# Patient Record
Sex: MALE | Race: OTHER | Employment: FULL TIME | URBAN - METROPOLITAN AREA
[De-identification: names, ages, dates, MRNs, and addresses within clinical notes are randomized per-mention and may not be internally consistent; named-entity substitution may affect disease eponyms.]

---

## 2018-03-22 PROBLEM — M46.1 SACROILIITIS, NOT ELSEWHERE CLASSIFIED (HCC): Status: ACTIVE | Noted: 2018-03-22

## 2021-06-08 ENCOUNTER — OFFICE VISIT (OUTPATIENT)
Dept: PAIN MANAGEMENT | Age: 41
End: 2021-06-08
Payer: COMMERCIAL

## 2021-06-08 VITALS
WEIGHT: 220 LBS | HEART RATE: 100 BPM | TEMPERATURE: 97.8 F | DIASTOLIC BLOOD PRESSURE: 75 MMHG | SYSTOLIC BLOOD PRESSURE: 141 MMHG | HEIGHT: 69 IN | BODY MASS INDEX: 32.58 KG/M2

## 2021-06-08 DIAGNOSIS — M54.16 LUMBAR RADICULOPATHY: ICD-10-CM

## 2021-06-08 DIAGNOSIS — G89.4 CHRONIC PAIN SYNDROME: ICD-10-CM

## 2021-06-08 DIAGNOSIS — M47.817 LUMBOSACRAL SPONDYLOSIS WITHOUT MYELOPATHY: ICD-10-CM

## 2021-06-08 DIAGNOSIS — M46.1 SACROILIITIS, NOT ELSEWHERE CLASSIFIED (HCC): ICD-10-CM

## 2021-06-08 DIAGNOSIS — Z79.891 ENCOUNTER FOR LONG-TERM OPIATE ANALGESIC USE: ICD-10-CM

## 2021-06-08 PROCEDURE — 99213 OFFICE O/P EST LOW 20 MIN: CPT | Performed by: NURSE PRACTITIONER

## 2021-06-08 RX ORDER — PREGABALIN 75 MG/1
75 CAPSULE ORAL 2 TIMES DAILY
Qty: 60 CAPSULE | Refills: 1 | Status: SHIPPED | OUTPATIENT
Start: 2021-06-10 | End: 2021-08-11 | Stop reason: SDUPTHER

## 2021-06-08 RX ORDER — TRAMADOL HYDROCHLORIDE 50 MG/1
50 TABLET ORAL 2 TIMES DAILY PRN
Qty: 45 TABLET | Refills: 0 | Status: SHIPPED | OUTPATIENT
Start: 2021-06-14 | End: 2021-07-07 | Stop reason: SDUPTHER

## 2021-06-08 ASSESSMENT — ENCOUNTER SYMPTOMS
GASTROINTESTINAL NEGATIVE: 1
EYES NEGATIVE: 1
SHORTNESS OF BREATH: 0
BACK PAIN: 1
COUGH: 0

## 2021-06-08 NOTE — PROGRESS NOTES
[facility]) Physicians  Neurosurgery and Pain [facility]  [address], [address] [facility] , [address] [facility], [place]: [phone]  F: [phone]        Άγιος Γεώργιος 4  (1980)    6/8/2021    Subjective:     Άγιος Γεώργιος 4 is 35 y.o. male who complains today of:    Chief Complaint   Patient presents with    Back Pain         Allergies:  Patient has no known allergies. No past medical history on file. No past surgical history on file. No family history on file. Social History     Socioeconomic History    Marital status:      Spouse name: Not on file    Number of children: Not on file    Years of education: Not on file    Highest education level: Not on file   Occupational History    Not on file   Tobacco Use    Smoking status: Never Smoker    Smokeless tobacco: Never Used   Substance and Sexual Activity    Alcohol use: Yes     Alcohol/week: 0.0 standard drinks    Drug use: No    Sexual activity: Not on file   Other Topics Concern    Not on file   Social History Narrative    Not on file     Social Determinants of Health     Financial Resource Strain:     Difficulty of Paying Living Expenses:    Food Insecurity:     Worried About Running Out of Food in the Last Year:     [address] in the Last Year:    Transportation Needs:     Lack of Transportation (Medical):      Lack of Transportation (Non-Medical):    Physical Activity:     Days of Exercise per Week:     Minutes of Exercise per Session:    Stress:     Feeling of Stress :    Social Connections:     Frequency of Communication with Friends and Family:     Frequency of Social Gatherings with Friends and Family:     Attends Confucianism Services:     Active Member of Clubs or Organizations:     Attends Club or Organization Meetings:     Marital Status:    Intimate Partner Violence:     Fear of Current or Ex-Partner:     Emotionally Abused:     Physically Abused:     Sexually Abused: Current Outpatient Medications on File Prior to Visit   Medication Sig Dispense Refill    traMADol (ULTRAM) 50 MG tablet Take 1 tablet by mouth 2 times daily as needed for Pain for up to 30 days. Can take 1-2 tabs daily PRN pain for 30 days. #45 tabs. Fill date 5/15/21 45 tablet 0    pregabalin (LYRICA) 75 MG capsule Take 1 capsule by mouth 2 times daily for 30 days. 60 capsule 0    lidocaine (XYLOCAINE) 5 % ointment Apply topically as needed. Apply as directed BID PRN 1 Tube 2    testosterone enanthate (DELATESTRYL) 200 MG/ML injection Inject 1 mL into the muscle once for 1 dose. 1 ml every 2weeks 10 mL 0    Syringe/Needle, Disp, (SYRINGE 3CC/62DF1-6/2\") 22G X 1-1/2\" 3 ML MISC by Does not apply route. 20 each 06     No current facility-administered medications on file prior to visit. He {HAS HAS BIJ:13536} tried physical therapy. Date of lastof last PT treatment: {NONE DEFAULTED:16791::\"none\"}    HPI    Review of Systems      Objective:     Vitals:  BP (!) 141/75 (Site: Right Upper Arm)   Pulse 100   Temp 97.8 °F (36.6 °C) (Temporal)   Ht 5' 9\" (1.753 m)   Wt 220 lb (99.8 kg)   BMI 32.49 kg/m²        Physical Exam      Assessment:     {No diagnosis found. (Refresh or delete this SmartLink)}    Plan:          No orders of the defined types were placed in this encounter. No orders of the defined types were placed in this encounter. Follow up:  No follow-ups on file.     Reddy Land, APRN - CNP
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
## 2021-07-07 ENCOUNTER — OFFICE VISIT (OUTPATIENT)
Dept: PAIN MANAGEMENT | Age: 41
End: 2021-07-07
Payer: COMMERCIAL

## 2021-07-07 VITALS — HEIGHT: 69 IN | BODY MASS INDEX: 32.58 KG/M2 | TEMPERATURE: 97.4 F | WEIGHT: 220 LBS

## 2021-07-07 DIAGNOSIS — M47.817 LUMBOSACRAL SPONDYLOSIS WITHOUT MYELOPATHY: ICD-10-CM

## 2021-07-07 DIAGNOSIS — Z79.891 ENCOUNTER FOR LONG-TERM OPIATE ANALGESIC USE: ICD-10-CM

## 2021-07-07 DIAGNOSIS — M46.1 SACROILIITIS, NOT ELSEWHERE CLASSIFIED (HCC): ICD-10-CM

## 2021-07-07 DIAGNOSIS — G89.4 CHRONIC PAIN SYNDROME: ICD-10-CM

## 2021-07-07 DIAGNOSIS — M54.16 LUMBAR RADICULOPATHY: ICD-10-CM

## 2021-07-07 PROBLEM — G47.33 OBSTRUCTIVE SLEEP APNEA SYNDROME: Status: ACTIVE | Noted: 2021-07-07

## 2021-07-07 PROCEDURE — 99213 OFFICE O/P EST LOW 20 MIN: CPT | Performed by: NURSE PRACTITIONER

## 2021-07-07 RX ORDER — TRAMADOL HYDROCHLORIDE 50 MG/1
50 TABLET ORAL 2 TIMES DAILY PRN
Qty: 45 TABLET | Refills: 0 | Status: SHIPPED | OUTPATIENT
Start: 2021-07-14 | End: 2021-08-11 | Stop reason: SDUPTHER

## 2021-07-07 RX ORDER — TRAZODONE HYDROCHLORIDE 50 MG/1
TABLET ORAL
COMMUNITY
Start: 2021-06-11 | End: 2022-05-18

## 2021-07-07 ASSESSMENT — ENCOUNTER SYMPTOMS
DIARRHEA: 0
CONSTIPATION: 0
COUGH: 0
EYES NEGATIVE: 1
NAUSEA: 0
GASTROINTESTINAL NEGATIVE: 1
BACK PAIN: 1
SHORTNESS OF BREATH: 0
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
## 2021-08-11 ENCOUNTER — OFFICE VISIT (OUTPATIENT)
Dept: PAIN MANAGEMENT | Age: 41
End: 2021-08-11
Payer: COMMERCIAL

## 2021-08-11 VITALS
SYSTOLIC BLOOD PRESSURE: 130 MMHG | BODY MASS INDEX: 32.58 KG/M2 | WEIGHT: 220 LBS | HEIGHT: 69 IN | TEMPERATURE: 97.6 F | DIASTOLIC BLOOD PRESSURE: 90 MMHG

## 2021-08-11 DIAGNOSIS — M25.561 ACUTE PAIN OF RIGHT KNEE: Primary | ICD-10-CM

## 2021-08-11 DIAGNOSIS — M47.817 LUMBOSACRAL SPONDYLOSIS WITHOUT MYELOPATHY: ICD-10-CM

## 2021-08-11 DIAGNOSIS — Z79.891 ENCOUNTER FOR LONG-TERM OPIATE ANALGESIC USE: ICD-10-CM

## 2021-08-11 DIAGNOSIS — M54.16 LUMBAR RADICULOPATHY: ICD-10-CM

## 2021-08-11 DIAGNOSIS — M46.1 SACROILIITIS, NOT ELSEWHERE CLASSIFIED (HCC): ICD-10-CM

## 2021-08-11 DIAGNOSIS — G89.4 CHRONIC PAIN SYNDROME: ICD-10-CM

## 2021-08-11 PROCEDURE — 99213 OFFICE O/P EST LOW 20 MIN: CPT | Performed by: NURSE PRACTITIONER

## 2021-08-11 RX ORDER — METHYLPREDNISOLONE 4 MG/1
TABLET ORAL
Qty: 1 KIT | Refills: 0 | Status: SHIPPED | OUTPATIENT
Start: 2021-08-11 | End: 2021-08-17

## 2021-08-11 RX ORDER — PREGABALIN 75 MG/1
75 CAPSULE ORAL 2 TIMES DAILY
Qty: 60 CAPSULE | Refills: 1 | Status: SHIPPED | OUTPATIENT
Start: 2021-08-11 | End: 2021-10-11 | Stop reason: SDUPTHER

## 2021-08-11 RX ORDER — TRAMADOL HYDROCHLORIDE 50 MG/1
50 TABLET ORAL 2 TIMES DAILY PRN
Qty: 45 TABLET | Refills: 0 | Status: SHIPPED | OUTPATIENT
Start: 2021-08-14 | End: 2021-09-13 | Stop reason: SDUPTHER

## 2021-08-11 ASSESSMENT — ENCOUNTER SYMPTOMS
EYES NEGATIVE: 1
COUGH: 0
CONSTIPATION: 0
BACK PAIN: 1
DIARRHEA: 0
NAUSEA: 0
GASTROINTESTINAL NEGATIVE: 1
SHORTNESS OF BREATH: 0

## 2021-08-11 NOTE — LETTER
08/11/21    To Whom It May Concern: This is to certify that Morris Peters is currently under my professional care. Margi Quintanilla will be:     []   disabled until approximately     []   able to return to work on      []   No work restrictions      []   Work restrictions include    [x]  Margi Quintanilla was seen in our office today for an appointment.       Comments:         Provider: Zoey Russ, LOW - CNP
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
## 2021-09-13 ENCOUNTER — OFFICE VISIT (OUTPATIENT)
Dept: PAIN MANAGEMENT | Age: 41
End: 2021-09-13
Payer: COMMERCIAL

## 2021-09-13 VITALS — WEIGHT: 220 LBS | TEMPERATURE: 96.9 F | HEIGHT: 69 IN | BODY MASS INDEX: 32.58 KG/M2

## 2021-09-13 DIAGNOSIS — M47.817 LUMBOSACRAL SPONDYLOSIS WITHOUT MYELOPATHY: ICD-10-CM

## 2021-09-13 DIAGNOSIS — G89.4 CHRONIC PAIN SYNDROME: ICD-10-CM

## 2021-09-13 DIAGNOSIS — M54.16 LUMBAR RADICULOPATHY: ICD-10-CM

## 2021-09-13 DIAGNOSIS — Z79.891 ENCOUNTER FOR LONG-TERM OPIATE ANALGESIC USE: ICD-10-CM

## 2021-09-13 DIAGNOSIS — M46.1 SACROILIITIS, NOT ELSEWHERE CLASSIFIED (HCC): ICD-10-CM

## 2021-09-13 PROCEDURE — 99213 OFFICE O/P EST LOW 20 MIN: CPT | Performed by: NURSE PRACTITIONER

## 2021-09-13 RX ORDER — TRAMADOL HYDROCHLORIDE 50 MG/1
50 TABLET ORAL 2 TIMES DAILY PRN
Qty: 45 TABLET | Refills: 0 | Status: SHIPPED | OUTPATIENT
Start: 2021-09-16 | End: 2021-10-11 | Stop reason: SDUPTHER

## 2021-09-13 ASSESSMENT — ENCOUNTER SYMPTOMS
COUGH: 0
SHORTNESS OF BREATH: 0
DIARRHEA: 0
NAUSEA: 0
BACK PAIN: 1
EYES NEGATIVE: 1
CONSTIPATION: 0
GASTROINTESTINAL NEGATIVE: 1
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
## 2021-10-11 ENCOUNTER — OFFICE VISIT (OUTPATIENT)
Dept: PAIN MANAGEMENT | Age: 41
End: 2021-10-11
Payer: COMMERCIAL

## 2021-10-11 VITALS
HEIGHT: 69 IN | DIASTOLIC BLOOD PRESSURE: 80 MMHG | BODY MASS INDEX: 32.58 KG/M2 | TEMPERATURE: 97.6 F | WEIGHT: 220 LBS | SYSTOLIC BLOOD PRESSURE: 124 MMHG

## 2021-10-11 DIAGNOSIS — M47.817 LUMBOSACRAL SPONDYLOSIS WITHOUT MYELOPATHY: ICD-10-CM

## 2021-10-11 DIAGNOSIS — M54.16 LUMBAR RADICULOPATHY: ICD-10-CM

## 2021-10-11 DIAGNOSIS — G89.4 CHRONIC PAIN SYNDROME: ICD-10-CM

## 2021-10-11 DIAGNOSIS — Z79.891 ENCOUNTER FOR LONG-TERM OPIATE ANALGESIC USE: ICD-10-CM

## 2021-10-11 DIAGNOSIS — M46.1 SACROILIITIS, NOT ELSEWHERE CLASSIFIED (HCC): ICD-10-CM

## 2021-10-11 PROCEDURE — 99213 OFFICE O/P EST LOW 20 MIN: CPT | Performed by: NURSE PRACTITIONER

## 2021-10-11 RX ORDER — PREGABALIN 75 MG/1
75 CAPSULE ORAL 2 TIMES DAILY
Qty: 60 CAPSULE | Refills: 1 | Status: SHIPPED | OUTPATIENT
Start: 2021-10-11 | End: 2021-12-15 | Stop reason: SDUPTHER

## 2021-10-11 RX ORDER — TRAMADOL HYDROCHLORIDE 50 MG/1
50 TABLET ORAL 2 TIMES DAILY PRN
Qty: 45 TABLET | Refills: 0 | Status: SHIPPED | OUTPATIENT
Start: 2021-10-16 | End: 2021-10-18 | Stop reason: SDUPTHER

## 2021-10-11 ASSESSMENT — ENCOUNTER SYMPTOMS
SHORTNESS OF BREATH: 0
EYES NEGATIVE: 1
NAUSEA: 0
CONSTIPATION: 0
GASTROINTESTINAL NEGATIVE: 1
BACK PAIN: 1
COUGH: 0
DIARRHEA: 0

## 2021-10-11 NOTE — PROGRESS NOTES
Lizzy Fuentes  (1980)    10/11/2021    Subjective:     Lizzy Fuentes is 35 y.o. male who complains today of:    Chief Complaint   Patient presents with    Back Pain         Allergies:  Patient has no known allergies. No past medical history on file. No past surgical history on file. No family history on file. Social History     Socioeconomic History    Marital status:      Spouse name: Not on file    Number of children: Not on file    Years of education: Not on file    Highest education level: Not on file   Occupational History    Not on file   Tobacco Use    Smoking status: Never Smoker    Smokeless tobacco: Never Used   Substance and Sexual Activity    Alcohol use: Yes     Alcohol/week: 0.0 standard drinks    Drug use: No    Sexual activity: Not on file   Other Topics Concern    Not on file   Social History Narrative    Not on file     Social Determinants of Health     Financial Resource Strain:     Difficulty of Paying Living Expenses:    Food Insecurity:     Worried About Running Out of Food in the Last Year:     [address] in the Last Year:    Transportation Needs:     Lack of Transportation (Medical):      Lack of Transportation (Non-Medical):    Physical Activity:     Days of Exercise per Week:     Minutes of Exercise per Session:    Stress:     Feeling of Stress :    Social Connections:     Frequency of Communication with Friends and Family:     Frequency of Social Gatherings with Friends and Family:     Attends Buddhist Services:     Active Member of Clubs or Organizations:     Attends Club or Organization Meetings:     Marital Status:    Intimate Partner Violence:     Fear of Current or Ex-Partner:     Emotionally Abused:     Physically Abused:     Sexually Abused:        Current Outpatient Medications on File Prior to Visit   Medication Sig Dispense Refill    traZODone (DESYREL) 50 MG tablet TAKE 1 TABLET DAILY AT BEDTIME      lidocaine (XYLOCAINE) 5 % ointment Apply topically as needed. Apply as directed BID PRN 1 Tube 2    Syringe/Needle, Disp, (SYRINGE 3CC/01KY5-3/2\") 22G X 1-1/2\" 3 ML MISC by Does not apply route. 20 each 06    testosterone enanthate (DELATESTRYL) 200 MG/ML injection Inject 1 mL into the muscle once for 1 dose. 1 ml every 2weeks 10 mL 0     No current facility-administered medications on file prior to visit. Pt presents today for a f/u of his pain. PCP is Dr. Madisyn Lanza MD.  Pt last saw  this NP. He says he is having more Rt LE pain that is radiating down his leg and Rt low back pain worse. Says Past RF ablation helped back and LE pain. Denies radiating pain. He says he is getting \"flare ups\" a lot and is thinking he is ready for procedure. He had to call of work today. 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 He thinks the weather change isn't helping Discussion of consideration of PT for the knee as well. He had Rt L2,3,4,5 RF ablation on 3/18/21 which he feels continues to offer significant relief. He says he is able to move more. He has chronic low back and right leg pain. Right leg pain goes down into the foot at times. He works full time. He has left lami with Dr Sherly Rogers years ago. Rt L5-S1 cornelius denied by his insurance - needs MRI/PT. Hx Rt L5-S1 cornelius, SI joint injections and Rt L2,3,4,5 RF ablation in the past all with relief and to allow him to be more active. Uses a right knee brace at times.      He Lyrica 75mg BID - He says he does take this BID and feels it is helpful. He continues tramadol 50mg 1-2 tabs daily PRN . He gets trazadone at night from Dr. Madisyn Lanza. Pt feels pain level with his medication is 7/10 today but typically <4/10 he reports, and worse without medication. Pt feels that being on feet, weather change, standing, work makes the pain worse, and medication, RF ablation, stretching makes the pain better.    Pt feels his medication helps   him function and improve his quality of life, specifically says allows to work and do ADL's. Pt denies radiating numbness and tingling. Denies recent falls, injuries or trauma. Pt denies new weakness. Pt reports PT has been done in the past.         Review of Systems   Constitutional: Negative for fever. HENT: Negative. Eyes: Negative. Respiratory: Negative for cough and shortness of breath. Cardiovascular: Negative for chest pain. Gastrointestinal: Negative. Negative for constipation, diarrhea and nausea. Endocrine: Negative. Genitourinary: Negative. Musculoskeletal: Positive for arthralgias and back pain. Negative for neck pain. Skin: Negative. Neurological: Negative for dizziness, weakness and numbness. Psychiatric/Behavioral: Negative. Objective:     Vitals:  /80 (Site: Left Upper Arm, Position: Sitting, Cuff Size: Medium Adult)   Temp 97.6 °F (36.4 °C)   Ht 5' 9\" (1.753 m)   Wt 220 lb (99.8 kg)   BMI 32.49 kg/m² Pain Score:   5      Physical Exam  Vitals and nursing note reviewed. This is a pleasant male who answers questions appropriately and follows commands.  Pt is alert and oriented x 3.  Recent and remote memory is intact.  Mood and affect depressed, judgement and insight are normal.  No signs of distress, no dyspnea or SOB noted. HEENT: PERRL.  Neck is supple, trachea midline.  No lymphadenopathy noted.  Decreased ROM with flexion and extension of low back at extremes.  Tender with palpation to lumbar spine on Rt with positive provacative maneuvers on Rt, mild tenderness over Lt lower spine.  Tightness appreciated over left lower lumbar paraspinal muscles.  Tattoos noted. Negative SLR.   Tightness in both hamstrings noted.  Rt knee without pain with palpation. Full range of motion.    Able to ambulate without difficulty.    Balance and coordination normal.  Strength functional for ambulation. Cranial nerves II-XII are intact. Assessment:      Diagnosis Orders   1.  Lumbosacral spondylosis without myelopathy  traMADol (ULTRAM) 50 MG tablet    pregabalin (LYRICA) 75 MG capsule    NE RADIOFREQUENCY NEUROTOMY LUMBAR OR SACRAL, W IMAGE GUIDANCE, SINGLE    NE RADIOFREQ NEUROTOMY LUMBAR OR SACRAL, W IMAGE GUIDE,EA ADDL LEVEL    NE RADIOFREQ NEUROTOMY LUMBAR OR SACRAL, W IMAGE GUIDE,EA ADDL LEVEL    CHG FLUOR NEEDLE/CATH SPINE/PARASPINAL DX/THER ADDON   2. Chronic pain syndrome  traMADol (ULTRAM) 50 MG tablet   3. Sacroiliitis, not elsewhere classified (HCC)  traMADol (ULTRAM) 50 MG tablet   4. Lumbar radiculopathy  traMADol (ULTRAM) 50 MG tablet    pregabalin (LYRICA) 75 MG capsule   5. Encounter for long-term opiate analgesic use  traMADol (ULTRAM) 50 MG tablet       Plan:     Periodic Controlled Substance Monitoring: Possible medication side effects, risk of tolerance/dependence & alternative treatments discussed., No signs of potential drug abuse or diversion identified. , Assessed functional status., Obtaining appropriate analgesic effect of treatment. (Carlee Rendon, APRN - CNP)    Orders Placed This Encounter   Medications    traMADol (ULTRAM) 50 MG tablet     Sig: Take 1 tablet by mouth 2 times daily as needed for Pain for up to 30 days. Can take 1-2 tabs daily PRN pain for 30 days. #45 tabs. Fill date 10/16/21     Dispense:  45 tablet     Refill:  0     Reduce doses taken as pain becomes manageable    pregabalin (LYRICA) 75 MG capsule     Sig: Take 1 capsule by mouth 2 times daily for 60 days.      Dispense:  60 capsule     Refill:  1       Orders Placed This Encounter   Procedures    CHG FLUOR NEEDLE/CATH SPINE/PARASPINAL DX/THER ADDON     Standing Status:   Future     Standing Expiration Date:   1/9/2022    NE RADIOFREQUENCY NEUROTOMY LUMBAR OR SACRAL, W IMAGE GUIDANCE, SINGLE     Rt L2,3,4,5 RFA with SK     Standing Status:   Future     Standing Expiration Date:   1/9/2022    NE RADIOFREQ NEUROTOMY LUMBAR OR SACRAL, W IMAGE GUIDE,EA ADDL LEVEL     Standing Status:   Future     Standing Expiration Date:   1/9/2022    [place] RADIOFREQ NEUROTOMY LUMBAR OR SACRAL, W IMAGE GUIDE,EA ADDL LEVEL     Standing Status:   Future     Standing Expiration Date:   1/9/2022     Discussed options with the patient today. Anatomic model pathology was shown and reviewed with pt. at this time we will continue Lyrica 75 mg twice a day and tramadol 1 to 2 tablets daily as needed for pain #45 tabs for 30 days. He continues to work but needed to cough work today due to Ruiz [employer] excuse given for today. We will go ahead and order  right L2, L3, L4, L5 RF ablation with Dr. Bhavin Pappas as pt has had >80% pain relief with diagnostic MBB's and improvement with past RF ablations. he has failed conservative treatment in the past. Anatomic model of pathology was shown. Risks and benefits of the procedure were discussed. All questions were answered and patient understands and agrees with the plan. Discussed home exercise program.  Relevant imaging and pain generators reviewed. Pt verbalized understanding and agrees with above plan. Pt has chronic pain. Utox reviewed and appropriate from July 2021- discussed to avoid ETOH and narcotics and reviewed NDP. ORT score 1 - low risk. Narcan Rx declined. Will continue medications for chronic pain that has been previously directed as they do help pt function with ADL and improve quality of life. Pt is aware goal is to use the least amount of medication possible to allow pt to function and help with quality of life as discussed in detail. Ideal to keep MME below 50 which it is. Have discussed proper disposal of narcotic medication. Advised to have medications in safe place and locked up/in lock box. Discussed possible risks of opiate medication with pt, including, but not limited to possible constipation, nausea or vomiting, sedation, urinary retention, dependence and possible addiction. Pt agrees to use medication as prescribed/as directed.  Pt advised to not use opiates while driving or operating heavy equipment, or in situations where pt may harm him/herself or others. Pt is aware that while on narcotics, pt needs to be seen to reassess pain and reassess need for continued medication at that time. NDP reviewed. OARRS was reviewed. This NP saw pt under direct supervision of Dr. Memo Crane. Follow up:  Return in about 5 weeks (around 11/15/2021) for review meds and reassess pain.     Abdifatah Land, APRN - CNP

## 2021-10-11 NOTE — LETTER
10/11/21    To Whom It May Concern: This is to certify that Lizzy Fuentes is currently under my professional care. [x]  Amanda Fulton was seen in our office today for an appointment.       Comments:         Provider: LOW Reza - CNP

## 2021-10-12 ENCOUNTER — TELEPHONE (OUTPATIENT)
Dept: PAIN MANAGEMENT | Age: 41
End: 2021-10-12

## 2021-10-12 NOTE — TELEPHONE ENCOUNTER
ORDER PLACED:    Date: 10/11/21  Description: RIGHT L2,3,4,5 RFA  Order Number: [id number]  Ordering Provider: Kristin Delarosa  Performing Provider: [facility]  CPT Codes: 94799,04135  ICD10 Codes: L95.811    ORDER SENT TO Carolina He TO [facility]

## 2021-10-15 ENCOUNTER — TELEPHONE (OUTPATIENT)
Dept: PAIN MANAGEMENT | Age: 41
End: 2021-10-15

## 2021-10-15 NOTE — TELEPHONE ENCOUNTER
BENEFITS: RT L2,3,4,5 RFA    Insurance: [employer]  Phone: [phone]  Contact Name: Julieta Aguilar  Effective Date: 3.1.2020     Plan year: YES-CALENDAR  Deductible: N/A      Deductible Met: N/A  Allowed/benefits paid at: 100% AFTER $20.00 COPAY  OOP: 500.00 MET $7.21  Freq Limits: [id number] 7 64636-BASED ON MEDICAL NECESSITY  Prior Auth Requirement: YES THROUGH AIM--AUTH COMPLETED BY SV    Notes: NO PRE-EX CLAUSE    Call Reference #: [id number]    Time of call: 9:45AM

## 2021-10-18 DIAGNOSIS — M54.16 LUMBAR RADICULOPATHY: ICD-10-CM

## 2021-10-18 DIAGNOSIS — M47.817 LUMBOSACRAL SPONDYLOSIS WITHOUT MYELOPATHY: ICD-10-CM

## 2021-10-18 DIAGNOSIS — Z79.891 ENCOUNTER FOR LONG-TERM OPIATE ANALGESIC USE: ICD-10-CM

## 2021-10-18 DIAGNOSIS — M46.1 SACROILIITIS, NOT ELSEWHERE CLASSIFIED (HCC): ICD-10-CM

## 2021-10-18 DIAGNOSIS — G89.4 CHRONIC PAIN SYNDROME: ICD-10-CM

## 2021-10-18 RX ORDER — TRAMADOL HYDROCHLORIDE 50 MG/1
50 TABLET ORAL 2 TIMES DAILY PRN
Qty: 45 TABLET | Refills: 0 | Status: SHIPPED | OUTPATIENT
Start: 2021-10-18 | End: 2021-11-15 | Stop reason: SDUPTHER

## 2021-10-18 NOTE — TELEPHONE ENCOUNTER
Requested Prescriptions     Pending Prescriptions Disp Refills    traMADol (ULTRAM) 50 MG tablet 45 tablet 0     Sig: Take 1 tablet by mouth 2 times daily as needed for Pain for up to 30 days. Can take 1-2 tabs daily PRN pain for 30 days. #45 tabs. Fill date 10/16/21       Patient last seen on:  10/11  Date of last surgery:  n/a  Date of last refill: 9/16   Pain level:  n/a  Patient complaining of:  Pharmacy did not receive prescription sent on 10/11. Will Dr. Melecio Head resend?   Future appts: 10/21

## 2021-10-21 ENCOUNTER — OFFICE VISIT (OUTPATIENT)
Dept: PAIN MANAGEMENT | Age: 41
End: 2021-10-21
Payer: COMMERCIAL

## 2021-10-21 DIAGNOSIS — M47.817 LUMBOSACRAL SPONDYLOSIS WITHOUT MYELOPATHY: ICD-10-CM

## 2021-10-21 PROCEDURE — 64636 DESTROY L/S FACET JNT ADDL: CPT | Performed by: PAIN MEDICINE

## 2021-10-21 PROCEDURE — 64635 DESTROY LUMB/SAC FACET JNT: CPT | Performed by: PAIN MEDICINE

## 2021-10-21 RX ORDER — BETAMETHASONE SODIUM PHOSPHATE AND BETAMETHASONE ACETATE 3; 3 MG/ML; MG/ML
6 INJECTION, SUSPENSION INTRA-ARTICULAR; INTRALESIONAL; INTRAMUSCULAR; SOFT TISSUE ONCE
Status: COMPLETED | OUTPATIENT
Start: 2021-10-21 | End: 2021-10-21

## 2021-10-21 RX ORDER — LIDOCAINE HYDROCHLORIDE 10 MG/ML
10 INJECTION, SOLUTION EPIDURAL; INFILTRATION; INTRACAUDAL; PERINEURAL ONCE
Status: COMPLETED | OUTPATIENT
Start: 2021-10-21 | End: 2021-10-21

## 2021-10-21 RX ADMIN — BETAMETHASONE SODIUM PHOSPHATE AND BETAMETHASONE ACETATE 6 MG: 3; 3 INJECTION, SUSPENSION INTRA-ARTICULAR; INTRALESIONAL; INTRAMUSCULAR; SOFT TISSUE at 15:17

## 2021-10-21 RX ADMIN — LIDOCAINE HYDROCHLORIDE 10 MG: 10 INJECTION, SOLUTION EPIDURAL; INFILTRATION; INTRACAUDAL; PERINEURAL at 15:17

## 2021-10-21 NOTE — PROGRESS NOTES
[facility]) Physicians  Neurosurgery and Pain [address] [place] [address]: [phone]  F: [phone]      Lumbar Radio Frequency Ablation     Provider: Severino Branch DO          Patient Name: Valerie Amin : 1980        Date: 10/21/2021      Valerie Amin is here today for interventional pain management. Standard ASI guidelines were followed and sterile technique used. Area was cleaned with Betadine x3. Informed consent was obtained. Fluoroscopic guidance was used for this procedure. Multiple views of fluoroscopy were used during procedure to assist with needle placement. Appropriate sized RF 10mm active tip needle was used and advance to appropriate anatomic location. There was appropriate multifidus contraction noted with motor stimulation at 2 Hz between 0.5-1.5 volts. No limb or gluteal contraction was noted taking it up to 3.5 volts. Prior to lesioning at 80 degrees Celsius for 90 seconds, approximately 0.75mg/1mg of Celestone and ½ cc of 1% preservative free Lidocaine was injected. Impedance was between 200-500 ohms during the procedure. Patient tolerated the procedure well, no obvious complications occurred during the procedure. Patient was appropriately monitored and discharged home in stable condition with their usual motor strength. Post Op instructions were given to patient.           [] Bilateral [] T11 [] L1 [] S1     [] T12 [x] L2 [] S2    [x] Right  [x] L3 [] S3      [x] L4 [] S4    [] Left  [x] L5                              Severino Branch DO

## 2021-11-11 RX ORDER — BETAMETHASONE SODIUM PHOSPHATE AND BETAMETHASONE ACETATE 3; 3 MG/ML; MG/ML
6 INJECTION, SUSPENSION INTRA-ARTICULAR; INTRALESIONAL; INTRAMUSCULAR; SOFT TISSUE ONCE
Status: COMPLETED | OUTPATIENT
Start: 2021-11-11 | End: 2021-11-11

## 2021-11-11 RX ADMIN — BETAMETHASONE SODIUM PHOSPHATE AND BETAMETHASONE ACETATE 6 MG: 3; 3 INJECTION, SUSPENSION INTRA-ARTICULAR; INTRALESIONAL; INTRAMUSCULAR; SOFT TISSUE at 11:38

## 2021-11-15 ENCOUNTER — OFFICE VISIT (OUTPATIENT)
Dept: PAIN MANAGEMENT | Age: 41
End: 2021-11-15
Payer: COMMERCIAL

## 2021-11-15 VITALS
HEIGHT: 69 IN | SYSTOLIC BLOOD PRESSURE: 128 MMHG | DIASTOLIC BLOOD PRESSURE: 78 MMHG | TEMPERATURE: 97.8 F | WEIGHT: 220 LBS | BODY MASS INDEX: 32.58 KG/M2

## 2021-11-15 DIAGNOSIS — G89.4 CHRONIC PAIN SYNDROME: ICD-10-CM

## 2021-11-15 DIAGNOSIS — Z79.891 ENCOUNTER FOR LONG-TERM OPIATE ANALGESIC USE: ICD-10-CM

## 2021-11-15 DIAGNOSIS — M46.1 SACROILIITIS, NOT ELSEWHERE CLASSIFIED (HCC): ICD-10-CM

## 2021-11-15 DIAGNOSIS — M54.16 LUMBAR RADICULOPATHY: ICD-10-CM

## 2021-11-15 DIAGNOSIS — M47.817 LUMBOSACRAL SPONDYLOSIS WITHOUT MYELOPATHY: ICD-10-CM

## 2021-11-15 PROCEDURE — 99213 OFFICE O/P EST LOW 20 MIN: CPT | Performed by: NURSE PRACTITIONER

## 2021-11-15 RX ORDER — TRAMADOL HYDROCHLORIDE 50 MG/1
50 TABLET ORAL 2 TIMES DAILY PRN
Qty: 45 TABLET | Refills: 0 | Status: SHIPPED | OUTPATIENT
Start: 2021-11-17 | End: 2021-12-15 | Stop reason: SDUPTHER

## 2021-11-15 ASSESSMENT — ENCOUNTER SYMPTOMS
NAUSEA: 0
COUGH: 0
GASTROINTESTINAL NEGATIVE: 1
DIARRHEA: 0
BACK PAIN: 1
CONSTIPATION: 0
SHORTNESS OF BREATH: 0
EYES NEGATIVE: 1

## 2021-11-15 NOTE — PROGRESS NOTES
Margie Leavitt  (1980)    11/15/2021    Subjective:     Margie Leavitt is 35 y.o. male who complains today of:    Chief Complaint   Patient presents with    Follow-up     Lower Back Pain         Allergies:  Patient has no known allergies. History reviewed. No pertinent past medical history. History reviewed. No pertinent surgical history. History reviewed. No pertinent family history. Social History     Socioeconomic History    Marital status:      Spouse name: Not on file    Number of children: Not on file    Years of education: Not on file    Highest education level: Not on file   Occupational History    Not on file   Tobacco Use    Smoking status: Never Smoker    Smokeless tobacco: Never Used   Substance and Sexual Activity    Alcohol use: Yes     Alcohol/week: 0.0 standard drinks    Drug use: No    Sexual activity: Not on file   Other Topics Concern    Not on file   Social History Narrative    Not on file     Social Determinants of Health     Financial Resource Strain:     Difficulty of Paying Living Expenses: Not on file   Food Insecurity:     Worried About Running Out of Food in the Last Year: Not on file    [place] of Food in the Last Year: Not on file   Transportation Needs:     Lack of Transportation (Medical): Not on file    Lack of Transportation (Non-Medical):  Not on file   Physical Activity:     Days of Exercise per Week: Not on file    Minutes of Exercise per Session: Not on file   Stress:     Feeling of Stress : Not on file   Social Connections:     Frequency of Communication with Friends and Family: Not on file    Frequency of Social Gatherings with Friends and Family: Not on file    Attends Yazidism Services: Not on file    Active Member of Clubs or Organizations: Not on file    Attends Club or Organization Meetings: Not on file    Marital Status: Not on file   Intimate Partner Violence:     Fear of Current or Ex-Partner: Not on file   [facility] Emotionally Abused: Not on file    Physically Abused: Not on file    Sexually Abused: Not on file   Housing Stability:     Unable to Pay for Housing in the Last Year: Not on file    Number of Places Lived in the Last Year: Not on file    Unstable Housing in the Last Year: Not on file       Current Outpatient Medications on File Prior to Visit   Medication Sig Dispense Refill    pregabalin (LYRICA) 75 MG capsule Take 1 capsule by mouth 2 times daily for 60 days. 60 capsule 1    traZODone (DESYREL) 50 MG tablet TAKE 1 TABLET DAILY AT BEDTIME      lidocaine (XYLOCAINE) 5 % ointment Apply topically as needed. Apply as directed BID PRN 1 Tube 2    testosterone enanthate (DELATESTRYL) 200 MG/ML injection Inject 1 mL into the muscle once for 1 dose. 1 ml every 2weeks 10 mL 0    Syringe/Needle, Disp, (SYRINGE 3CC/45KM7-4/2\") 22G X 1-1/2\" 3 ML MISC by Does not apply route. 20 each 06     No current facility-administered medications on file prior to visit. Pt presents today for a f/u of his pain. PCP is Dr. Angelique Peña MD.  Patient last saw Dr. Elaine Melgar on October 21, 2021 for right L2-3-4 5 RF ablation. Says has had >50% pain relief. He says his Rt LE pain is easing as well. He says he is able to move more. Discussion of consideration of PT for the knee as well in the past, but says this hasn't been too bad \" I haven't had any major flare ups lately. Lawereduardo Walters He has chronic low back and right leg pain. Right leg pain goes down into the foot at times. He works full time. He has left lami with Dr Stephenson Pro years ago. Rt L5-S1 cornelius denied by his insurance - needs MRI/PT. Hx Rt L5-S1 cornelius, SI joint injections and Rt L2,3,4,5 RF ablation in the past all with relief and to allow him to be more active. Uses a right knee brace at times. He is vaccinated against Covid.      He Lyrica 75mg BID - He says he does take this BID and feels it is helpful. He continues tramadol 50mg 1-2 tabs daily PRN .  He gets trazadone at night from Dr. Kathia Holliday feels pain level with his medication is 3/10, and 8/10 without medication. Pt feels that recent illness, weather change, work, prolonged position makes the pain worse, and stretching, medication, RF ablation makes the pain better. Pt feels his medication helps   him function and improve his quality of life, specifically says allows to work and do ADL's. Pt denies radiating numbness and tingling. Denies recent falls, injuries or trauma. Pt denies new weakness. Pt reports PT has been tried in the past.         Review of Systems   Constitutional: Negative for fever. HENT: Negative. Eyes: Negative. Respiratory: Negative for cough and shortness of breath. Cardiovascular: Negative for chest pain. Gastrointestinal: Negative. Negative for constipation, diarrhea and nausea. Endocrine: Negative. Genitourinary: Negative. Musculoskeletal: Positive for arthralgias and back pain. Negative for neck pain. Skin: Negative. Neurological: Positive for numbness. Negative for dizziness and weakness. Psychiatric/Behavioral: Negative. Objective:     Vitals:  /78 (Site: Right Upper Arm)   Temp 97.8 °F (36.6 °C) (Infrared)   Ht 5' 9\" (1.753 m)   Wt 220 lb (99.8 kg)   BMI 32.49 kg/m² Pain Score:   3      Physical Exam  Vitals and nursing note reviewed. This is a pleasant male who answers questions appropriately and follows commands.  Pt is alert and oriented x 3.  Recent and remote memory is intact.  Mood and affect depressed, judgement and insight are normal.  No signs of distress, no dyspnea or SOB noted. HEENT: PERRL.  Neck is supple, trachea midline.  No lymphadenopathy noted.  Decreased ROM with flexion and extension of low back at extremes.  Mild tenderness with palpation to lumbar spine.  Tightness appreciated over left lower lumbar paraspinal muscles.  Tattoos noted. Negative SLR.   Tightness in both hamstrings noted.  Rt knee without pain with palpation.  Full range of motion.    Able to ambulate without difficulty.    Balance and coordination normal.  Strength functional for ambulation. Cranial nerves II-XII are intact. Assessment:      Diagnosis Orders   1. Lumbosacral spondylosis without myelopathy  traMADol (ULTRAM) 50 MG tablet   2. Chronic pain syndrome  traMADol (ULTRAM) 50 MG tablet   3. Sacroiliitis, not elsewhere classified (HCC)  traMADol (ULTRAM) 50 MG tablet   4. Lumbar radiculopathy  traMADol (ULTRAM) 50 MG tablet   5. Encounter for long-term opiate analgesic use  traMADol (ULTRAM) 50 MG tablet       Plan:     Periodic Controlled Substance Monitoring: Possible medication side effects, risk of tolerance/dependence & alternative treatments discussed., No signs of potential drug abuse or diversion identified. , Assessed functional status., Obtaining appropriate analgesic effect of treatment. (Carlee Rendon, APRN - CNP)    Orders Placed This Encounter   Medications    traMADol (ULTRAM) 50 MG tablet     Sig: Take 1 tablet by mouth 2 times daily as needed for Pain for up to 30 days. Can take 1-2 tabs daily PRN pain for 30 days. #45 tabs. Fill date 11/17/21     Dispense:  45 tablet     Refill:  0     Reduce doses taken as pain becomes manageable       No orders of the defined types were placed in this encounter. Discussed options with the patient today. Anatomic model pathology was shown and reviewed with pt. he has a refill of Lyrica 75 mg twice a day and will continue this. We will continue tramadol 1 to 2 tablets daily as needed for pain #45 tabs for 30 days. He is doing better status post right RF ablation. Hopefully this is lasting. He continues to work. . All questions were answered. Discussed home exercise program.  Relevant imaging and pain generators reviewed. Pt verbalized understanding and agrees with above plan. Pt has chronic pain.  Utox reviewed and appropriate fromJuly 2021- discussed to avoid ETOH and narcotics and reviewed NDP.  ORT score 1 - low risk. Narcan Rx declined    Will continue medications for chronic pain that has been previously directed as they do help pt function with ADL and improve quality of life. Pt is aware goal is to use the least amount of medication possible to allow pt to function and help with quality of life as discussed in detail. Ideal to keep MME below 50 which it is. Have discussed proper disposal of narcotic medication. Advised to have medications in safe place and locked up/in lock box. Discussed possible risks of opiate medication with pt, including, but not limited to possible constipation, nausea or vomiting, sedation, urinary retention, dependence and possible addiction. Pt agrees to use medication as prescribed/as directed. Pt advised to not use opiates while driving or operating heavy equipment, or in situations where pt may harm him/herself or others. Pt is aware that while on narcotics, pt needs to be seen to reassess pain and reassess need for continued medication at that time. NDP reviewed. OARRS was reviewed. This NP saw pt under direct supervision of Dr. Denton Joseph. Follow up:  Return in about 4 weeks (around 12/13/2021) for review meds and reassess pain.     Lina Land, APRN - CNP

## 2021-12-15 ENCOUNTER — OFFICE VISIT (OUTPATIENT)
Dept: PAIN MANAGEMENT | Age: 41
End: 2021-12-15
Payer: COMMERCIAL

## 2021-12-15 VITALS
DIASTOLIC BLOOD PRESSURE: 82 MMHG | WEIGHT: 220 LBS | TEMPERATURE: 96.8 F | SYSTOLIC BLOOD PRESSURE: 137 MMHG | BODY MASS INDEX: 32.58 KG/M2 | HEIGHT: 69 IN

## 2021-12-15 DIAGNOSIS — M54.16 LUMBAR RADICULOPATHY: ICD-10-CM

## 2021-12-15 DIAGNOSIS — M46.1 SACROILIITIS, NOT ELSEWHERE CLASSIFIED (HCC): ICD-10-CM

## 2021-12-15 DIAGNOSIS — G89.4 CHRONIC PAIN SYNDROME: ICD-10-CM

## 2021-12-15 DIAGNOSIS — M47.817 LUMBOSACRAL SPONDYLOSIS WITHOUT MYELOPATHY: Primary | ICD-10-CM

## 2021-12-15 DIAGNOSIS — Z79.891 ENCOUNTER FOR LONG-TERM OPIATE ANALGESIC USE: ICD-10-CM

## 2021-12-15 PROCEDURE — 99213 OFFICE O/P EST LOW 20 MIN: CPT | Performed by: NURSE PRACTITIONER

## 2021-12-15 RX ORDER — PREGABALIN 75 MG/1
75 CAPSULE ORAL 2 TIMES DAILY
Qty: 60 CAPSULE | Refills: 1 | Status: SHIPPED | OUTPATIENT
Start: 2021-12-15 | End: 2022-03-14 | Stop reason: SDUPTHER

## 2021-12-15 RX ORDER — TRAMADOL HYDROCHLORIDE 50 MG/1
50 TABLET ORAL 2 TIMES DAILY PRN
Qty: 45 TABLET | Refills: 0 | Status: SHIPPED | OUTPATIENT
Start: 2021-12-16 | End: 2022-01-17 | Stop reason: SDUPTHER

## 2021-12-15 ASSESSMENT — ENCOUNTER SYMPTOMS
SHORTNESS OF BREATH: 0
GASTROINTESTINAL NEGATIVE: 1
BACK PAIN: 1
DIARRHEA: 0
CONSTIPATION: 0
EYES NEGATIVE: 1
NAUSEA: 0
COUGH: 0

## 2021-12-15 NOTE — PROGRESS NOTES
Ellen York  (1980)    12/15/2021    Subjective:     Ellen York is 35 y.o. male who complains today of:    Chief Complaint   Patient presents with    Leg Pain         Allergies:  Patient has no known allergies. History reviewed. No pertinent past medical history. History reviewed. No pertinent surgical history. History reviewed. No pertinent family history. Social History     Socioeconomic History    Marital status:      Spouse name: Not on file    Number of children: Not on file    Years of education: Not on file    Highest education level: Not on file   Occupational History    Not on file   Tobacco Use    Smoking status: Never Smoker    Smokeless tobacco: Never Used   Substance and Sexual Activity    Alcohol use: Yes     Alcohol/week: 0.0 standard drinks    Drug use: No    Sexual activity: Not on file   Other Topics Concern    Not on file   Social History Narrative    Not on file     Social Determinants of Health     Financial Resource Strain:     Difficulty of Paying Living Expenses: Not on file   Food Insecurity:     Worried About Running Out of Food in the Last Year: Not on file    [place] of Food in the Last Year: Not on file   Transportation Needs:     Lack of Transportation (Medical): Not on file    Lack of Transportation (Non-Medical):  Not on file   Physical Activity:     Days of Exercise per Week: Not on file    Minutes of Exercise per Session: Not on file   Stress:     Feeling of Stress : Not on file   Social Connections:     Frequency of Communication with Friends and Family: Not on file    Frequency of Social Gatherings with Friends and Family: Not on file    Attends Orthodoxy Services: Not on file    Active Member of Clubs or Organizations: Not on file    Attends Club or Organization Meetings: Not on file    Marital Status: Not on file   Intimate Partner Violence:     Fear of Current or Ex-Partner: Not on file    Emotionally Abused: Not on file  Physically Abused: Not on file    Sexually Abused: Not on file   Housing Stability:     Unable to Pay for Housing in the Last Year: Not on file    Number of Places Lived in the Last Year: Not on file    Unstable Housing in the Last Year: Not on file       Current Outpatient Medications on File Prior to Visit   Medication Sig Dispense Refill    traZODone (DESYREL) 50 MG tablet TAKE 1 TABLET DAILY AT BEDTIME      lidocaine (XYLOCAINE) 5 % ointment Apply topically as needed. Apply as directed BID PRN 1 Tube 2    testosterone enanthate (DELATESTRYL) 200 MG/ML injection Inject 1 mL into the muscle once for 1 dose. 1 ml every 2weeks 10 mL 0    Syringe/Needle, Disp, (SYRINGE 3CC/17ZU8-0/2\") 22G X 1-1/2\" 3 ML MISC by Does not apply route. 20 each 06     No current facility-administered medications on file prior to visit. Pt presents today for a f/u of his pain. PCP is Dr. Ashley Bardales MD.  Patient last saw this NP. On October 21, 2021 had right L2-3-4 5 RF ablation with significant relief. He says the Rt leg pain hasn't been too bad. He hasn't been sleeping well recently so this irritates his pain. He says his knee pain is manageable. He has chronic low back and right leg pain. Right leg pain goes down into the foot at times. He works full time. He has left lami with Dr Trixie Davey years ago. Rt L5-S1 cornelius denied by his insurance - needs MRI/PT. Hx Rt L5-S1 cornelius, SI joint injections and Rt L2,3,4,5 RF ablation in the past all with relief and to allow him to be more active. Uses a right knee brace at times. He is vaccinated against Covid.      He Lyrica 75mg BID - He says he does take this BID and feels it is helpful. He continues tramadol 50mg 1-2 tabs daily PRN. He gets trazadone at night from Dr. Juliette Sanchez feels pain level with his medication is \"almost nothing\", and 5/10 without medication.   Pt feels that weather change, stress, sleep makes the pain worse, and medication, change of position, stretching makes the pain better. Pt feels his medication helps   him function and improve his quality of life, specifically says allows to do ADL's and work. Pt denies radiating numbness and tingling. Denies recent falls, injuries or trauma. Pt denies new weakness. Pt reports PT has been tried in the past.         Review of Systems   Constitutional: Negative for fever. HENT: Negative. Eyes: Negative. Respiratory: Negative for cough and shortness of breath. Cardiovascular: Negative for chest pain. Gastrointestinal: Negative. Negative for constipation, diarrhea and nausea. Endocrine: Negative. Genitourinary: Negative. Musculoskeletal: Positive for arthralgias and back pain. Negative for neck pain. Skin: Negative. Neurological: Negative for dizziness, weakness and numbness. Psychiatric/Behavioral: Negative. Objective:     Vitals:  /82 (Site: Right Upper Arm, Position: Sitting)   Temp 96.8 °F (36 °C)   Ht 5' 9\" (1.753 m)   Wt 220 lb (99.8 kg)   BMI 32.49 kg/m² Pain Score:   5      Physical Exam  Vitals and nursing note reviewed. This is a pleasant male who answers questions appropriately and follows commands.  Pt is alert and oriented x 3.  Recent and remote memory is intact.  Mood and affect, judgement and insight are normal.  No signs of distress, no dyspnea or SOB noted. HEENT: PERRL.  Neck is supple, trachea midline.  No lymphadenopathy noted.  Decreased ROM with flexion and extension of low back at extremes.  Not too tender with palpation to lumbar spine.  Tightness appreciated over left lower lumbar paraspinal muscles.  Tattoos noted. Negative SLR.   Tightness in both hamstrings noted.  Rt knee without pain with palpation.  Full range of motion.    Able to ambulate without difficulty.    Balance and coordination normal.  Strength functional for ambulation. Cranial nerves II-XII are intact.       Assessment:      Diagnosis Orders   1.  Lumbosacral spondylosis without myelopathy  traMADol (ULTRAM) 50 MG tablet    pregabalin (LYRICA) 75 MG capsule   2. Chronic pain syndrome  traMADol (ULTRAM) 50 MG tablet   3. Sacroiliitis, not elsewhere classified (HCC)  traMADol (ULTRAM) 50 MG tablet   4. Lumbar radiculopathy  traMADol (ULTRAM) 50 MG tablet    pregabalin (LYRICA) 75 MG capsule   5. Encounter for long-term opiate analgesic use  traMADol (ULTRAM) 50 MG tablet       Plan:     Periodic Controlled Substance Monitoring: Possible medication side effects, risk of tolerance/dependence & alternative treatments discussed., No signs of potential drug abuse or diversion identified. , Assessed functional status., Obtaining appropriate analgesic effect of treatment. (Carlee Rendon, APRN - CNP)    Orders Placed This Encounter   Medications    traMADol (ULTRAM) 50 MG tablet     Sig: Take 1 tablet by mouth 2 times daily as needed for Pain for up to 30 days. Can take 1-2 tabs daily PRN pain for 30 days. #45 tabs. Fill date 12/16/21     Dispense:  45 tablet     Refill:  0     Reduce doses taken as pain becomes manageable    pregabalin (LYRICA) 75 MG capsule     Sig: Take 1 capsule by mouth 2 times daily for 60 days. Dispense:  60 capsule     Refill:  1       No orders of the defined types were placed in this encounter. Discussed options with the patient today. Anatomic model pathology was shown and reviewed with pt. He needs refills of Lyrica 75 mg twice a day and will continue tramadol 1 to 2 tablets daily as needed for pain #45 tabs for 30 days. He is doing better status post right RF ablation. Hopefully this is lasting. He continues to work. All questions were answered. Discussed home exercise program and  smoking cessation. Relevant imaging and pain generators reviewed. Pt verbalized understanding and agrees with above plan. Pt has chronic pain. Utox reviewed and appropriate from July 2021. ORT score 1 - low risk. Narcan Rx declined.     Will continue medications for chronic pain that has been previously directed as they do help pt function with ADL and improve quality of life. Pt is aware goal is to use the least amount of medication possible to allow pt to function and help with quality of life as discussed in detail. Ideal to keep MME below 50 which it is. Have discussed proper disposal of narcotic medication. Advised to have medications in safe place and locked up/in lock box. Discussed possible risks of opiate medication with pt, including, but not limited to possible constipation, nausea or vomiting, sedation, urinary retention, dependence and possible addiction. Pt agrees to use medication as prescribed/as directed. Pt advised to not use opiates while driving or operating heavy equipment, or in situations where pt may harm him/herself or others. Pt is aware that while on narcotics, pt needs to be seen to reassess pain and reassess need for continued medication at that time. NDP reviewed. OARRS was reviewed. This NP saw pt under direct supervision of Dr. Mariah Champagne. Follow up:  Return in about 4 weeks (around 1/12/2022) for review meds and reassess pain.     Gume Land, APRN - CNP

## 2021-12-20 ENCOUNTER — OFFICE VISIT (OUTPATIENT)
Dept: FAMILY MEDICINE CLINIC | Age: 41
End: 2021-12-20
Payer: COMMERCIAL

## 2021-12-20 VITALS
BODY MASS INDEX: 32.58 KG/M2 | SYSTOLIC BLOOD PRESSURE: 120 MMHG | HEIGHT: 69 IN | HEART RATE: 86 BPM | WEIGHT: 220 LBS | DIASTOLIC BLOOD PRESSURE: 80 MMHG | TEMPERATURE: 97.6 F | OXYGEN SATURATION: 96 %

## 2021-12-20 DIAGNOSIS — H10.33 ACUTE BACTERIAL CONJUNCTIVITIS OF BOTH EYES: Primary | ICD-10-CM

## 2021-12-20 PROCEDURE — 99213 OFFICE O/P EST LOW 20 MIN: CPT | Performed by: NURSE PRACTITIONER

## 2021-12-20 RX ORDER — CIPROFLOXACIN HYDROCHLORIDE 3.5 MG/ML
1-2 SOLUTION/ DROPS TOPICAL 4 TIMES DAILY
Qty: 2 EACH | Refills: 0 | Status: SHIPPED | OUTPATIENT
Start: 2021-12-20 | End: 2021-12-27

## 2021-12-20 SDOH — ECONOMIC STABILITY: FOOD INSECURITY: WITHIN THE PAST 12 MONTHS, YOU WORRIED THAT YOUR FOOD WOULD RUN OUT BEFORE YOU GOT MONEY TO BUY MORE.: NEVER TRUE

## 2021-12-20 SDOH — ECONOMIC STABILITY: FOOD INSECURITY: WITHIN THE PAST 12 MONTHS, THE FOOD YOU BOUGHT JUST DIDN'T LAST AND YOU DIDN'T HAVE MONEY TO GET MORE.: NEVER TRUE

## 2021-12-20 ASSESSMENT — PATIENT HEALTH QUESTIONNAIRE - PHQ9
1. LITTLE INTEREST OR PLEASURE IN DOING THINGS: 0
SUM OF ALL RESPONSES TO PHQ QUESTIONS 1-9: 0
2. FEELING DOWN, DEPRESSED OR HOPELESS: 0
SUM OF ALL RESPONSES TO PHQ QUESTIONS 1-9: 0
SUM OF ALL RESPONSES TO PHQ9 QUESTIONS 1 & 2: 0
SUM OF ALL RESPONSES TO PHQ QUESTIONS 1-9: 0

## 2021-12-20 ASSESSMENT — SOCIAL DETERMINANTS OF HEALTH (SDOH): HOW HARD IS IT FOR YOU TO PAY FOR THE VERY BASICS LIKE FOOD, HOUSING, MEDICAL CARE, AND HEATING?: NOT HARD AT ALL

## 2021-12-20 NOTE — PROGRESS NOTES
Subjective:      Patient ID: Dary Quesada is a 35 y.o. male who presents today for:  Chief Complaint   Patient presents with    Conjunctivitis     Patient is here c/o possible pink eye. Pt states he has discharge and pain in both eyes. Pt states this morning, both eyes were crusted shut. Pt describe pain as burning, states issue has been ongoing for about 3 days. Pt states he has used OTC eye drops with minimal relief. HPI      Only the right eye   He wears contacts   It feels like its on fire  Nasty discharge   He can see fine   They are getting swollen   Its red   It was stuck shut       No past medical history on file. No past surgical history on file. Social History     Socioeconomic History    Marital status:      Spouse name: Not on file    Number of children: Not on file    Years of education: Not on file    Highest education level: Not on file   Occupational History    Not on file   Tobacco Use    Smoking status: Never Smoker    Smokeless tobacco: Never Used   Substance and Sexual Activity    Alcohol use: Yes     Alcohol/week: 0.0 standard drinks    Drug use: No    Sexual activity: Not on file   Other Topics Concern    Not on file   Social History Narrative    Not on file     Social Determinants of Health     Financial Resource Strain: Low Risk     Difficulty of Paying Living Expenses: Not hard at all   Food Insecurity: No Food Insecurity    Worried About Running Out of Food in the Last Year: Never true    [address] in the Last Year: Never true   Transportation Needs:     Lack of Transportation (Medical): Not on file    Lack of Transportation (Non-Medical):  Not on file   Physical Activity:     Days of Exercise per Week: Not on file    Minutes of Exercise per Session: Not on file   Stress:     Feeling of Stress : Not on file   Social Connections:     Frequency of Communication with Friends and Family: Not on file    Frequency of Social Gatherings with Friends and Family: Not on file    Attends Muslim Services: Not on file    Active Member of Clubs or Organizations: Not on file    Attends Club or Organization Meetings: Not on file    Marital Status: Not on file   Intimate Partner Violence:     Fear of Current or Ex-Partner: Not on file    Emotionally Abused: Not on file    Physically Abused: Not on file    Sexually Abused: Not on file   Housing Stability:     Unable to Pay for Housing in the Last Year: Not on file    Number of [place] in the Last Year: Not on file    Unstable Housing in the Last Year: Not on file     No family history on file. No Known Allergies  Current Outpatient Medications   Medication Sig Dispense Refill    traMADol (ULTRAM) 50 MG tablet Take 1 tablet by mouth 2 times daily as needed for Pain for up to 30 days. Can take 1-2 tabs daily PRN pain for 30 days. #45 tabs. Fill date 12/16/21 45 tablet 0    pregabalin (LYRICA) 75 MG capsule Take 1 capsule by mouth 2 times daily for 60 days. 60 capsule 1    traZODone (DESYREL) 50 MG tablet TAKE 1 TABLET DAILY AT BEDTIME      lidocaine (XYLOCAINE) 5 % ointment Apply topically as needed. Apply as directed BID PRN 1 Tube 2    Syringe/Needle, Disp, (SYRINGE 3CC/48BS2-4/2\") 22G X 1-1/2\" 3 ML MISC by Does not apply route. 20 each 06    testosterone enanthate (DELATESTRYL) 200 MG/ML injection Inject 1 mL into the muscle once for 1 dose. 1 ml every 2weeks 10 mL 0     No current facility-administered medications for this visit. Review of Systems   Constitutional: Negative for chills, fatigue and fever. HENT: Negative for congestion, rhinorrhea, sore throat and trouble swallowing. Eyes: Positive for pain, discharge and redness. Negative for visual disturbance. Respiratory: Negative for cough, shortness of breath and wheezing. Gastrointestinal: Negative for diarrhea, nausea and vomiting. Musculoskeletal: Negative for myalgias. Skin: Negative for rash.    Neurological: 0.3 % ophthalmic solution; Place 1-2 drops into both eyes 4 times daily for 7 days      No orders of the defined types were placed in this encounter. Orders Placed This Encounter   Medications    ciprofloxacin (CILOXAN) 0.3 % ophthalmic solution     Sig: Place 1-2 drops into both eyes 4 times daily for 7 days     Dispense:  2 each     Refill:  0     One for left eye and one for right eye     There are no discontinued medications. Return if symptoms worsen or fail to improve. Reviewed with the patient/family: current clinical status & medications. Side effects of the medication prescribed today, as well as treatment plan/rationale and result expectations have been discussed with the patient/family who expresses understanding. Patient will be discharged home in stable condition. Follow up with PCP to evaluate treatment results or return if symptoms worsen or fail to improve. Discussed signs and symptoms which require immediate follow-up in ED/call to 911. Understanding verbalized. I have reviewed the patient's medical history in detail and updated the computerized patient record.     aMria R Swan, APRN - CNP

## 2022-01-17 ENCOUNTER — VIRTUAL VISIT (OUTPATIENT)
Dept: PAIN MANAGEMENT | Age: 42
End: 2022-01-17
Payer: COMMERCIAL

## 2022-01-17 DIAGNOSIS — M47.817 LUMBOSACRAL SPONDYLOSIS WITHOUT MYELOPATHY: ICD-10-CM

## 2022-01-17 DIAGNOSIS — M46.1 SACROILIITIS, NOT ELSEWHERE CLASSIFIED (HCC): ICD-10-CM

## 2022-01-17 DIAGNOSIS — G89.4 CHRONIC PAIN SYNDROME: ICD-10-CM

## 2022-01-17 DIAGNOSIS — M54.16 LUMBAR RADICULOPATHY: ICD-10-CM

## 2022-01-17 DIAGNOSIS — Z79.891 ENCOUNTER FOR LONG-TERM OPIATE ANALGESIC USE: ICD-10-CM

## 2022-01-17 PROCEDURE — 99442 PR PHYS/QHP TELEPHONE EVALUATION 11-20 MIN: CPT | Performed by: NURSE PRACTITIONER

## 2022-01-17 RX ORDER — TRAMADOL HYDROCHLORIDE 50 MG/1
50 TABLET ORAL 2 TIMES DAILY PRN
Qty: 45 TABLET | Refills: 0 | Status: SHIPPED | OUTPATIENT
Start: 2022-01-17 | End: 2022-02-14 | Stop reason: SDUPTHER

## 2022-01-17 NOTE — PROGRESS NOTES
Vandana Gaytan  (1980)    1/17/2022    TELEHEALTH EVALUATION -- Audio/Visual (During KQKEO-06 public health emergency)    Due to Aggie 19 outbreak, patient's office visit was converted to a virtual visit. Patient was contacted and agreed to proceed with a virtual visit via audio-visual platform. Patient understands that this encounter is a billable visit and that insurance coverage and co-pays are up to their individual insurance plans. At the beginning of this telehealth encounter, I verified with the patient their name and date of birth. Verbal consent for telehealth encounter was obtained. Subjective:       Chief Complaint   Patient presents with    Follow-up     Lumbosacral spondylosis without myelopathy       Vandana Gaytan is 35 y.o. male who complains today of: Allergies:  Patient has no known allergies. History:  History reviewed. No pertinent past medical history. History reviewed. No pertinent surgical history. History reviewed. No pertinent family history. Social History     Socioeconomic History    Marital status:      Spouse name: Not on file    Number of children: Not on file    Years of education: Not on file    Highest education level: Not on file   Occupational History    Not on file   Tobacco Use    Smoking status: Never Smoker    Smokeless tobacco: Never Used   Substance and Sexual Activity    Alcohol use: Yes     Alcohol/week: 0.0 standard drinks    Drug use: No    Sexual activity: Not on file   Other Topics Concern    Not on file   Social History Narrative    Not on file     Social Determinants of Health     Financial Resource Strain: Low Risk     Difficulty of Paying Living Expenses: Not hard at all   Food Insecurity: No Food Insecurity    Worried About Running Out of Food in the Last Year: Never true    [address] in the Last Year: Never true   Transportation Needs:     Lack of Transportation (Medical):  Not on file    Lack of Transportation (Non-Medical): Not on file   Physical Activity:     Days of Exercise per Week: Not on file    Minutes of Exercise per Session: Not on file   Stress:     Feeling of Stress : Not on file   Social Connections:     Frequency of Communication with Friends and Family: Not on file    Frequency of Social Gatherings with Friends and Family: Not on file    Attends Jehovah's witness Services: Not on file    Active Member of [address] [place] [address] [employer] or Organizations: Not on file    Attends Club or Organization Meetings: Not on file    Marital Status: Not on file   Intimate Partner Violence:     Fear of Current or Ex-Partner: Not on file    Emotionally Abused: Not on file    Physically Abused: Not on file    Sexually Abused: Not on file   Housing Stability:     Unable to Pay for Housing in the Last Year: Not on file    Number of [place] in the Last Year: Not on file    Unstable Housing in the Last Year: Not on file       Current Outpatient Medications on File Prior to Visit   Medication Sig Dispense Refill    pregabalin (LYRICA) 75 MG capsule Take 1 capsule by mouth 2 times daily for 60 days. 60 capsule 1    traZODone (DESYREL) 50 MG tablet TAKE 1 TABLET DAILY AT BEDTIME      lidocaine (XYLOCAINE) 5 % ointment Apply topically as needed. Apply as directed BID PRN 1 Tube 2    testosterone enanthate (DELATESTRYL) 200 MG/ML injection Inject 1 mL into the muscle once for 1 dose. 1 ml every 2weeks 10 mL 0    Syringe/Needle, Disp, (SYRINGE 3CC/53CH1-8/2\") 22G X 1-1/2\" 3 ML MISC by Does not apply route. 20 each 06     No current facility-administered medications on file prior to visit. Pt's f/u done today with a telehealth visit for his pain d/t Covid 19 pandemic. PCP is Dr. Neli Recinos MD.  Patient last saw this NP. Lyrica refilled last month with refill given. He says he has had some \"flare ups\" of low back pain for the past few weeks. He says the pain will radiate into upper buttock and hip.  Denies radiating numbness. He says he isn't sure if this is d/t the weather. He has been short staffed at work. On October 21, 2021 had right L2-3-4 5 RF ablation with significant relief. He says the Rt leg pain hasn't been too bad. He says his knee pain is manageable. He has chronic low back and right leg pain. Right leg pain goes down into the foot at times. He works full time. He has left lami with Dr Itz Valenzuela years ago. Rt L5-S1 cornelius denied by his insurance - needs MRI/PT. Hx Rt L5-S1 cornelius, SI joint injections and Rt L2,3,4,5 RF ablation in the past all with relief and to allow him to be more active. Uses a right knee brace at times. He is vaccinated against Covid.      He Lyrica 75mg BID - He says he does take this BID and feels it is helpful. He continues tramadol 50mg 1-2 tabs daily PRN. He gets trazadone at night from Dr. Cresencio Hyde. Pt feels pain level with his medication is \"tolerable/takes the edge off\", and 7-8/10 without medication. Pt feels that weather change, working more makes the pain worse, and medication, stretching, getting off feet makes the pain better. Pt feels his medication helps   him function and improve his quality of life, specifically says allows to work and do ADL's. Pt denies radiating numbness and tingling. Denies recent falls, injuries or trauma. Pt denies new weakness. Pt reports PT has been done in the past.           Review of Systems    Objective:     Vitals: There were no vitals taken for this visit.      PHYSICAL EXAMINATION:    [x] Alert  [x] Oriented to person/place/time  [x] No apparent distress      [x] Mood and affect normal  [x] Recent and remote memory intact  [x] Judgement and insight normal     [x] Breathing appears normal  [x] No rash, lesions or ulcers on visible skin    [] Cranial Nerves II-XII grossly intact    [x] Motor grossly intact in visible upper extremities    [] Motor grossly intact in visible lower extremities    [] Normal gait and station   [] No digital cyanosis    [] OTHER:      Due to this being a TeleHealth encounter, evaluation of the following organ systems is limited: Vitals/Constitutional/EENT/Resp/CV/GI//MS/Neuro/Skin/Heme-Lymph-Imm. Assessment:      Diagnosis Orders   1. Sacroiliitis, not elsewhere classified (HCC)  traMADol (ULTRAM) 50 MG tablet   2. Lumbosacral spondylosis without myelopathy  traMADol (ULTRAM) 50 MG tablet   3. Lumbar radiculopathy  traMADol (ULTRAM) 50 MG tablet   4. Chronic pain syndrome  traMADol (ULTRAM) 50 MG tablet   5. Encounter for long-term opiate analgesic use  traMADol (ULTRAM) 50 MG tablet       Plan:     Periodic Controlled Substance Monitoring: Possible medication side effects, risk of tolerance/dependence & alternative treatments discussed. ,No signs of potential drug abuse or diversion identified. ,Assessed functional status. ,Obtaining appropriate analgesic effect of treatment. (Carlee Rendon, APRN - CNP)    Orders Placed This Encounter   Medications    traMADol (ULTRAM) 50 MG tablet     Sig: Take 1 tablet by mouth 2 times daily as needed for Pain for up to 30 days. Can take 1-2 tabs daily PRN pain for 30 days. #45 tabs. Dispense:  45 tablet     Refill:  0     Reduce doses taken as pain becomes manageable       No orders of the defined types were placed in this encounter. Discussed options with the patient today. Telehealth visit d/t COVID-19 as noted above. He is describing more sacroiliac joint pain in his right upper buttock region. Consideration of SI joint injection in the future. Will examine next month to see if this needed. Cannot repeat RF ablation until April. At this time he has enough Lyrica any 5 mg twice a day and will continue his tramadol 50 mg 1-2 tabs daily as needed pain number 45 tablets for 30 days, as  this helps him function and get through work today he reports. All questions were answered. Discussed home exercise program and  smoking cessation.   Relevant imaging and pain generators reviewed. Pt verbalized understanding and agrees with above plan. Pt has chronic pain. Utox reviewed and appropriate from July 2021. ORT score 1 - low risk. Narcan Rx declined    Will continue medications for chronic pain that has been previously directed as they do help pt function with ADL and improve quality of life. Pt is aware goal is to use the least amount of medication possible to allow pt to function and help with quality of life as discussed in detail. Ideal to keep MME below 50 which it is. Have discussed proper disposal of narcotic medication. Advised to have medications in safe place and locked up/in lock box. Discussed possible risks of opiate medication with pt, including, but not limited to possible constipation, nausea or vomiting, sedation, urinary retention, dependence and possible addiction. Pt agrees to use medication as prescribed/as directed. Pt advised to not use opiates while driving or operating heavy equipment, or in situations where pt may harm him/herself or others. Pt is aware that while on narcotics, pt needs to be seen to reassess pain and reassess need for continued medication at that time. NDP reviewed. OARRS was reviewed. This NP saw pt under direct supervision of Dr. Nellie Bhatti. Follow up:  Return in about 4 weeks (around 2/14/2022) for review meds and reassess pain. Carlee Land, APRN - CNP      >50% of 12 minute appointment was spent with discussion, addressing questions and concerns, including prognosis, treatment options, patient education, and recommendations. [id number]    Pursuant to the emergency declaration under the [facility], [facility] waiver authority and the [place] and [employer] Act, this Virtual  Visit was conducted, with patient's consent, to reduce the patient's risk of exposure to COVID-19 and provide continuity of care for an established patient.     Services were provided through a video synchronous discussion virtually to substitute for in-person clinic visit.

## 2022-02-14 ENCOUNTER — VIRTUAL VISIT (OUTPATIENT)
Dept: PAIN MANAGEMENT | Age: 42
End: 2022-02-14
Payer: COMMERCIAL

## 2022-02-14 DIAGNOSIS — G89.4 CHRONIC PAIN SYNDROME: ICD-10-CM

## 2022-02-14 DIAGNOSIS — M46.1 SACROILIITIS, NOT ELSEWHERE CLASSIFIED (HCC): Primary | ICD-10-CM

## 2022-02-14 DIAGNOSIS — M54.16 LUMBAR RADICULOPATHY: ICD-10-CM

## 2022-02-14 DIAGNOSIS — Z79.891 ENCOUNTER FOR LONG-TERM OPIATE ANALGESIC USE: ICD-10-CM

## 2022-02-14 DIAGNOSIS — M47.817 LUMBOSACRAL SPONDYLOSIS WITHOUT MYELOPATHY: ICD-10-CM

## 2022-02-14 PROCEDURE — 99442 PR PHYS/QHP TELEPHONE EVALUATION 11-20 MIN: CPT | Performed by: NURSE PRACTITIONER

## 2022-02-14 RX ORDER — TRAMADOL HYDROCHLORIDE 50 MG/1
50 TABLET ORAL 2 TIMES DAILY PRN
Qty: 45 TABLET | Refills: 0 | Status: SHIPPED | OUTPATIENT
Start: 2022-02-17 | End: 2022-03-14 | Stop reason: SDUPTHER

## 2022-02-14 ASSESSMENT — ENCOUNTER SYMPTOMS
DIARRHEA: 0
SHORTNESS OF BREATH: 0
GASTROINTESTINAL NEGATIVE: 1
NAUSEA: 0
COUGH: 0
EYES NEGATIVE: 1
CONSTIPATION: 0
BACK PAIN: 1

## 2022-02-14 NOTE — PROGRESS NOTES
Damian Lock  (1980)    2/14/2022    TELEHEALTH EVALUATION -- Audio/Visual (During GWYDD-85 public health emergency)    Due to [place] 19 outbreak, patient's office visit was converted to a virtual visit. Patient was contacted and agreed to proceed with a virtual visit via audio-visual platform. Patient understands that this encounter is a billable visit and that insurance coverage and co-pays are up to their individual insurance plans. At the beginning of this telehealth encounter, I verified with the patient their name and date of birth. Verbal consent for telehealth encounter was obtained. Subjective:       Chief Complaint   Patient presents with    Back Pain       Damian Lock is 35 y.o. male who complains today of: Allergies:  Patient has no known allergies. History:  History reviewed. No pertinent past medical history. History reviewed. No pertinent surgical history. History reviewed. No pertinent family history. Social History     Socioeconomic History    Marital status:      Spouse name: Not on file    Number of children: Not on file    Years of education: Not on file    Highest education level: Not on file   Occupational History    Not on file   Tobacco Use    Smoking status: Never Smoker    Smokeless tobacco: Never Used   Substance and Sexual Activity    Alcohol use: Yes     Alcohol/week: 0.0 standard drinks    Drug use: No    Sexual activity: Not on file   Other Topics Concern    Not on file   Social History Narrative    Not on file     Social Determinants of Health     Financial Resource Strain: Low Risk     Difficulty of Paying Living Expenses: Not hard at all   Food Insecurity: No Food Insecurity    Worried About Running Out of Food in the Last Year: Never true    [address] in the Last Year: Never true   Transportation Needs:     Lack of Transportation (Medical): Not on file    Lack of Transportation (Non-Medical):  Not on file   Physical Activity:     Days of Exercise per Week: Not on file    Minutes of Exercise per Session: Not on file   Stress:     Feeling of Stress : Not on file   Social Connections:     Frequency of Communication with Friends and Family: Not on file    Frequency of Social Gatherings with Friends and Family: Not on file    Attends Anabaptist Services: Not on file    Active Member of [address] [place] [address] [employer] or Organizations: Not on file    Attends Club or Organization Meetings: Not on file    Marital Status: Not on file   Intimate Partner Violence:     Fear of Current or Ex-Partner: Not on file    Emotionally Abused: Not on file    Physically Abused: Not on file    Sexually Abused: Not on file   Housing Stability:     Unable to Pay for Housing in the Last Year: Not on file    Number of [place] in the Last Year: Not on file    Unstable Housing in the Last Year: Not on file       Current Outpatient Medications on File Prior to Visit   Medication Sig Dispense Refill    pregabalin (LYRICA) 75 MG capsule Take 1 capsule by mouth 2 times daily for 60 days. 60 capsule 1    traZODone (DESYREL) 50 MG tablet TAKE 1 TABLET DAILY AT BEDTIME      lidocaine (XYLOCAINE) 5 % ointment Apply topically as needed. Apply as directed BID PRN 1 Tube 2    testosterone enanthate (DELATESTRYL) 200 MG/ML injection Inject 1 mL into the muscle once for 1 dose. 1 ml every 2weeks 10 mL 0    Syringe/Needle, Disp, (SYRINGE 3CC/72RX8-4/2\") 22G X 1-1/2\" 3 ML MISC by Does not apply route. 20 each 06     No current facility-administered medications on file prior to visit. Pt's f/u done today with a telehealth visit for his pain d/t Covid 19 pandemic. PCP is Dr. Mahlon Soulier, MD.  Patient last saw this NP. He says he continues to have \"flare ups\" of low back pain and Rt upper buttock pain. Has been stretching the SI joint with some relief. Wonders about injection that was discussed last OV. for the past few weeks.  He says the pain will radiate into upper buttock and hip. Denies radiating numbness. He says he isn't sure if this is d/t the weather. He has been short staffed at work.       On October 21, 2021 had right L2-3-4 5 RF ablation with significant relief. He says the Rt leg pain hasn't been too bad. He says his knee pain is manageable. He has chronic low back and right leg pain. Right leg pain goes down into the foot at times. He works full time. He has left lami with Dr Howard Walter years ago. Rt L5-S1 cornelius denied by his insurance - needs MRI/PT. Hx Rt L5-S1 cornelius, SI joint injections and Rt L2,3,4,5 RF ablation in the past all with relief and to allow him to be more active. Uses a right knee brace at times. He is vaccinated against Covid.      He Lyrica 75mg BID - He says he does take this BID and feels it is helpful - he says he doesn't need this - had old Rx he was using. He continues tramadol 50mg 1-2 tabs daily PRN. He gets trazadone at night from Dr. Jethro Servin. Pt feels pain level with his medication is 0-4/10, and 6-7/10 without medication. Pt feels that weather change, being on feet makes the pain worse, and stretching, medication, past injections makes the pain better. Pt feels his medication helps   him function and improve his quality of life, specifically says allows to relax and do ADL's. Pt denies radiating numbness and tingling. Denies recent falls, injuries or trauma. Pt denies new weakness. Pt reports PT has been tried. Review of Systems   Constitutional: Negative for fever. HENT: Negative. Eyes: Negative. Respiratory: Negative for cough and shortness of breath. Cardiovascular: Negative for chest pain. Gastrointestinal: Negative. Negative for constipation, diarrhea and nausea. Endocrine: Negative. Genitourinary: Negative. Musculoskeletal: Positive for back pain. Negative for arthralgias and neck pain. Skin: Negative. Neurological: Negative for dizziness, weakness and numbness. Psychiatric/Behavioral: Negative. Objective:     Vitals: There were no vitals taken for this visit. PHYSICAL EXAMINATION:    [x] Alert  [x] Oriented to person/place/time  [x] No apparent distress      [x] Mood and affect normal  [x] Recent and remote memory intact  [x] Judgement and insight normal     [x] Breathing appears normal  [x] No rash, lesions or ulcers on visible skin    [] Cranial Nerves II-XII grossly intact    [x] Motor grossly intact in visible upper extremities    [] Motor grossly intact in visible lower extremities    [] Normal gait and station   [] No digital cyanosis    [] OTHER:      Due to this being a TeleHealth encounter, evaluation of the following organ systems is limited: Vitals/Constitutional/EENT/Resp/CV/GI//MS/Neuro/Skin/Heme-Lymph-Imm. Assessment:      Diagnosis Orders   1. Sacroiliitis, not elsewhere classified (HCC)  traMADol (ULTRAM) 50 MG tablet    VA INJECT SI JOINT ARTHRGRPHY&/ANES/STEROID W/IMAGE    CHG FLUOR NEEDLE/CATH SPINE/PARASPINAL DX/THER ADDON   2. Lumbosacral spondylosis without myelopathy  traMADol (ULTRAM) 50 MG tablet   3. Lumbar radiculopathy  traMADol (ULTRAM) 50 MG tablet   4. Chronic pain syndrome  traMADol (ULTRAM) 50 MG tablet   5. Encounter for long-term opiate analgesic use  traMADol (ULTRAM) 50 MG tablet       Plan:     Periodic Controlled Substance Monitoring: Possible medication side effects, risk of tolerance/dependence & alternative treatments discussed. ,No signs of potential drug abuse or diversion identified. ,Assessed functional status. ,Obtaining appropriate analgesic effect of treatment. (Carlee Rendon, APRN - CNP)    Orders Placed This Encounter   Medications    traMADol (ULTRAM) 50 MG tablet     Sig: Take 1 tablet by mouth 2 times daily as needed for Pain for up to 30 days. Can take 1-2 tabs daily PRN pain for 30 days. #45 tabs.   Fill date 2/17/22     Dispense:  45 tablet     Refill:  0     Reduce doses taken as pain becomes manageable       Orders Placed This Encounter   Procedures    CHG FLUOR NEEDLE/CATH SPINE/PARASPINAL DX/THER ADDON     Standing Status:   Future     Standing Expiration Date:   5/15/2022    ID INJECT SI JOINT ARTHRGRPHY&/ANES/STEROID W/IMAGE     Rt SI joint inj with SK     Standing Status:   Future     Standing Expiration Date:   5/15/2022     Discussed options with the patient today. Telehealth visit d/t COVID-19 as noted above. Will order Rt SI joint injection with Dr. Leeroy Rodriguez to settle his Sx as he is describing Rt SI joint pain. Can repeat RF ablation in April if needed. At this time he has enough Lyrica any 5 mg twice a day and will continue his tramadol 50 mg 1-2 tabs daily as needed pain number 45 tablets for 30 days, as  this helps him function and get through work today he reports. Letter for work he had appointment ok. All questions were answered. Discussed home exercise program.  Relevant imaging and pain generators reviewed. Pt verbalized understanding and agrees with above plan. Pt has chronic pain. Utox reviewed and appropriate from July 2021. ORT score 1 - low risk. Narcan Rx declined    Will continue medications for chronic pain that has been previously directed as they do help pt function with ADL and improve quality of life. Pt is aware goal is to use the least amount of medication possible to allow pt to function and help with quality of life as discussed in detail. Ideal to keep MME below 50 which it is. Have discussed proper disposal of narcotic medication. Advised to have medications in safe place and locked up/in lock box. Discussed possible risks of opiate medication with pt, including, but not limited to possible constipation, nausea or vomiting, sedation, urinary retention, dependence and possible addiction. Pt agrees to use medication as prescribed/as directed.  Pt advised to not use opiates while driving or operating heavy equipment, or in situations where pt may harm him/herself or others. Pt is aware that while on narcotics, pt needs to be seen to reassess pain and reassess need for continued medication at that time. NDP reviewed. OARRS was reviewed. This NP saw pt under direct supervision of Dr. Manuel Elliott. Follow up:  Return in about 4 weeks (around 3/14/2022) for f/u after procedure and reassess pain/medications. Carlee Land, APRN - CNP      >50% of 13 minute appointment was spent with discussion, addressing questions and concerns, including prognosis, treatment options, patient education, and recommendations. [id number]}    Pursuant to the emergency declaration under the [facility], 1135 waiver authority and the [employer] Act, this Virtual  Visit was conducted, with patient's consent, to reduce the patient's risk of exposure to COVID-19 and provide continuity of care for an established patient. Services were provided through a video synchronous discussion virtually to substitute for in-person clinic visit.

## 2022-03-02 ENCOUNTER — TELEPHONE (OUTPATIENT)
Dept: PAIN MANAGEMENT | Age: 42
End: 2022-03-02

## 2022-03-02 NOTE — TELEPHONE ENCOUNTER
PATIENT HAS NOT HAD RECENT SI JOINT TESTING IN THE LAST 12 MONTHS AND THAT WILL BE NEEDED TO SUBMIT TO INS. PLEASE SCHEDULE PATIENT FOR AN IN OFFICE APPT SO HE CAN BE EVALUATED FOR SI JOINT PAIN AND WE CAN SUBMIT FOR AUTH.

## 2022-03-02 NOTE — TELEPHONE ENCOUNTER
ORDER PLACED:    Date: 2/14/22  Description: RIGHT SI JOINT INJ  Order Number: [id number]  Ordering Provider: Negar Rudd  Performing Provider: DARIUS  CPT Codes: 03310  ICD10 Codes: M46.1

## 2022-03-14 ENCOUNTER — OFFICE VISIT (OUTPATIENT)
Dept: PAIN MANAGEMENT | Age: 42
End: 2022-03-14
Payer: COMMERCIAL

## 2022-03-14 VITALS
BODY MASS INDEX: 34.51 KG/M2 | HEIGHT: 69 IN | DIASTOLIC BLOOD PRESSURE: 82 MMHG | TEMPERATURE: 97 F | WEIGHT: 233 LBS | SYSTOLIC BLOOD PRESSURE: 128 MMHG

## 2022-03-14 DIAGNOSIS — M46.1 SACROILIITIS, NOT ELSEWHERE CLASSIFIED (HCC): Primary | ICD-10-CM

## 2022-03-14 DIAGNOSIS — G89.4 CHRONIC PAIN SYNDROME: ICD-10-CM

## 2022-03-14 DIAGNOSIS — M47.817 LUMBOSACRAL SPONDYLOSIS WITHOUT MYELOPATHY: ICD-10-CM

## 2022-03-14 DIAGNOSIS — M54.16 LUMBAR RADICULOPATHY: ICD-10-CM

## 2022-03-14 DIAGNOSIS — Z79.891 ENCOUNTER FOR LONG-TERM OPIATE ANALGESIC USE: ICD-10-CM

## 2022-03-14 PROCEDURE — 99213 OFFICE O/P EST LOW 20 MIN: CPT | Performed by: NURSE PRACTITIONER

## 2022-03-14 RX ORDER — PREGABALIN 75 MG/1
75 CAPSULE ORAL 2 TIMES DAILY
Qty: 60 CAPSULE | Refills: 1 | Status: SHIPPED | OUTPATIENT
Start: 2022-03-20 | End: 2022-07-29 | Stop reason: ALTCHOICE

## 2022-03-14 RX ORDER — TRAMADOL HYDROCHLORIDE 50 MG/1
50 TABLET ORAL 2 TIMES DAILY PRN
Qty: 45 TABLET | Refills: 0 | Status: SHIPPED | OUTPATIENT
Start: 2022-03-20 | End: 2022-04-13 | Stop reason: SDUPTHER

## 2022-03-14 ASSESSMENT — ENCOUNTER SYMPTOMS
SHORTNESS OF BREATH: 0
DIARRHEA: 0
CONSTIPATION: 0
EYES NEGATIVE: 1
BACK PAIN: 1
NAUSEA: 0
GASTROINTESTINAL NEGATIVE: 1
COUGH: 0

## 2022-03-14 NOTE — TELEPHONE ENCOUNTER
SK- SI JOINT AUTH- RIGHT X1- 3/15/2022 TO 3/28/2022     OK to schedule procedure approved as above. Please note sides/levels approved and date range.    (If applicable, sides/levels approved may differ from those ordered)    TO BE SCHEDULED WITH DR Caitlin Valentin

## 2022-03-14 NOTE — TELEPHONE ENCOUNTER
AUTHORIZATION: RIGHT SI JOINT      INSURANCE: Keya Chapa VIA: [employer]     Edataya Ritterks #: [id number]    DATE RANGE: 3/15/2022 TO 3/28/2022     TELEPHONE CALL ROUTED TO MA TO SCHEDULE.

## 2022-03-14 NOTE — PROGRESS NOTES
Mel Abraham  (1980)    3/14/2022    Subjective:     Mel Abrhaam is 35 y.o. male who complains today of:    Chief Complaint   Patient presents with    Back Pain         Allergies:  Patient has no known allergies. History reviewed. No pertinent past medical history. History reviewed. No pertinent surgical history. History reviewed. No pertinent family history. Social History     Socioeconomic History    Marital status:      Spouse name: Not on file    Number of children: Not on file    Years of education: Not on file    Highest education level: Not on file   Occupational History    Not on file   Tobacco Use    Smoking status: Never Smoker    Smokeless tobacco: Never Used   Substance and Sexual Activity    Alcohol use: Yes     Alcohol/week: 0.0 standard drinks    Drug use: No    Sexual activity: Not on file   Other Topics Concern    Not on file   Social History Narrative    Not on file     Social Determinants of Health     Financial Resource Strain: Low Risk     Difficulty of Paying Living Expenses: Not hard at all   Food Insecurity: No Food Insecurity    Worried About Running Out of Food in the Last Year: Never true    [address] in the Last Year: Never true   Transportation Needs:     Lack of Transportation (Medical): Not on file    Lack of Transportation (Non-Medical):  Not on file   Physical Activity:     Days of Exercise per Week: Not on file    Minutes of Exercise per Session: Not on file   Stress:     Feeling of Stress : Not on file   Social Connections:     Frequency of Communication with Friends and Family: Not on file    Frequency of Social Gatherings with Friends and Family: Not on file    Attends Church Services: Not on file    Active Member of Clubs or Organizations: Not on file    Attends Club or Organization Meetings: Not on file    Marital Status: Not on file   Intimate Partner Violence:     Fear of Current or Ex-Partner: Not on file   Steve Chanel Emotionally Abused: Not on file    Physically Abused: Not on file    Sexually Abused: Not on file   Housing Stability:     Unable to Pay for Housing in the Last Year: Not on file    Number of Places Lived in the Last Year: Not on file    Unstable Housing in the Last Year: Not on file       Current Outpatient Medications on File Prior to Visit   Medication Sig Dispense Refill    traZODone (DESYREL) 50 MG tablet TAKE 1 TABLET DAILY AT BEDTIME (Patient not taking: Reported on 3/14/2022)      lidocaine (XYLOCAINE) 5 % ointment Apply topically as needed. Apply as directed BID PRN (Patient not taking: Reported on 3/14/2022) 1 Tube 2    testosterone enanthate (DELATESTRYL) 200 MG/ML injection Inject 1 mL into the muscle once for 1 dose. 1 ml every 2weeks 10 mL 0    Syringe/Needle, Disp, (SYRINGE 3CC/35BG7-7/2\") 22G X 1-1/2\" 3 ML MISC by Does not apply route. (Patient not taking: Reported on 3/14/2022) 20 each 06     No current facility-administered medications on file prior to visit. Pt presents today for a f/u of his pain. PCP is Dr. Any Jordan MD.  Patient last saw this NP. Evidently physical exam needed to consider SI joint injection that was ordered last office visit. He is having more Rt upper buttock pain that is driving him crazy. \"during the day it is flared\" and isn't settling down. Denies radiating numbness. Has been stretching the SI joint with some relief. Wonders about injection that was discussed last OV. for the past few weeks. He says the pain will radiate into upper buttock and hip. Denies radiating numbness. He says he isn't sure if this is d/t the weather. He has been short staffed at work.       On October 21, 2021 had right L2-3-4 5 RF ablation with significant relief. He says the Rt leg pain hasn't been too bad. He says his knee pain is manageable. He has chronic low back and right leg pain. Right leg pain goes down into the foot at times. He works full time.  He has left lami with Dr Dayna Hyde years ago. Rt L5-S1 cornelius denied by his insurance - needs MRI/PT. Hx Rt L5-S1 cornelius, SI joint injections and Rt L2,3,4,5 RF ablation in the past all with relief and to allow him to be more active. Uses a right knee brace at times. He is vaccinated against Covid.      He Lyrica 75mg BID - He says he does take this BID and feels it is helpful - he says he doesn't need this - had old Rx he was using. He continues tramadol 50mg 1-2 tabs daily PRN. He gets trazadone at night from Dr. Gigi Severance. Pt feels pain level with his medication is 7/10, and 8/10 without medication. Pt feels that being on feet and not sure what lately is making the pain worse, and medication makes the pain better. Pt feels his medication helps   him function and improve his quality of life, specifically says allows him to work. Pt denies radiating numbness and tingling. Denies recent falls, injuries or trauma. Pt denies new weakness. Pt reports PT has been done in the apst.         Review of Systems   Constitutional: Negative for fever. HENT: Negative. Eyes: Negative. Respiratory: Negative for cough and shortness of breath. Cardiovascular: Negative for chest pain. Gastrointestinal: Negative. Negative for constipation, diarrhea and nausea. Endocrine: Negative. Genitourinary: Negative. Musculoskeletal: Positive for arthralgias and back pain. Negative for neck pain. Skin: Negative. Neurological: Negative for dizziness and weakness. Psychiatric/Behavioral: Negative. Objective:     Vitals:  /82 (Site: Left Upper Arm, Position: Sitting, Cuff Size: Large Adult)   Temp 97 °F (36.1 °C) (Infrared)   Ht 5' 9\" (1.753 m)   Wt 233 lb (105.7 kg)   BMI 34.41 kg/m² Pain Score:   7      Physical Exam  Vitals and nursing note reviewed. This is a pleasant male who answers questions appropriately and follows commands.  Pt is alert and oriented x 3.  Recent and remote memory is intact.  Mood and affect, judgement and insight are normal.  No signs of distress, no dyspnea or SOB noted. HEENT: PERRL.  Neck is supple, trachea midline.  No lymphadenopathy noted.  Decreased ROM with flexion and extension of low back at extremes.  Not too tender with palpation to lumbar spine. Positive Elaine's test, Gaenslen test, thigh thrust, and distraction test on Rt noted on exam.  Tightness appreciated over left lower lumbar paraspinal muscles.  Tattoos noted. Negative SLR.   Tightness in both hamstrings noted.  Rt knee without pain with palpation.  Full range of motion.    Able to ambulate without difficulty.    Balance and coordination normal.  Strength functional for ambulation. Cranial nerves II-XII are intact. Assessment:      Diagnosis Orders   1. Sacroiliitis, not elsewhere classified (HCC)  traMADol (ULTRAM) 50 MG tablet   2. Lumbosacral spondylosis without myelopathy  traMADol (ULTRAM) 50 MG tablet    pregabalin (LYRICA) 75 MG capsule   3. Lumbar radiculopathy  traMADol (ULTRAM) 50 MG tablet    pregabalin (LYRICA) 75 MG capsule   4. Chronic pain syndrome  traMADol (ULTRAM) 50 MG tablet   5. Encounter for long-term opiate analgesic use  traMADol (ULTRAM) 50 MG tablet       Plan:     Periodic Controlled Substance Monitoring: Possible medication side effects, risk of tolerance/dependence & alternative treatments discussed. ,No signs of potential drug abuse or diversion identified. ,Assessed functional status. ,Obtaining appropriate analgesic effect of treatment. (Carlee Rendon, APRN - CNP)    Orders Placed This Encounter   Medications    traMADol (ULTRAM) 50 MG tablet     Sig: Take 1 tablet by mouth 2 times daily as needed for Pain for up to 30 days. Can take 1-2 tabs daily PRN pain for 30 days. #45 tabs.   Fill date 3/20/22     Dispense:  45 tablet     Refill:  0     Reduce doses taken as pain becomes manageable    pregabalin (LYRICA) 75 MG capsule     Sig: Take 1 capsule by mouth 2 times daily for 60 days.     Dispense:  60 capsule     Refill:  1       No orders of the defined types were placed in this encounter. Discussed options with the patient today. Anatomic model pathology was shown and reviewed with pt. Continue Lyrica 75 mg twice a day and will continue his tramadol 50 mg 1-2 tabs daily as needed pain number 45 tablets for 30 days, as  this helps him function and get through work today he reports. Letter for work he had appointment ok. Will resubmit original order for Rt SI joint injection with Dr. Candi Corey  to settle his Sx. All questions were answered. Discussed home exercise program.  Relevant imaging and pain generators reviewed. Pt verbalized understanding and agrees with above plan. Pt has chronic pain. Utox reviewed and appropriate from July 2021. ORT score 1 - low risk. Narcan Rx declined. Will continue medications for chronic pain that has been previously directed as they do help pt function with ADL and improve quality of life. Pt is aware goal is to use the least amount of medication possible to allow pt to function and help with quality of life as discussed in detail. Ideal to keep MME below 50 which it is. Have discussed proper disposal of narcotic medication. Advised to have medications in safe place and locked up/in lock box. Discussed possible risks of opiate medication with pt, including, but not limited to possible constipation, nausea or vomiting, sedation, urinary retention, dependence and possible addiction. Pt agrees to use medication as prescribed/as directed. Pt advised to not use opiates while driving or operating heavy equipment, or in situations where pt may harm him/herself or others. Pt is aware that while on narcotics, pt needs to be seen to reassess pain and reassess need for continued medication at that time. NDP reviewed. OARRS was reviewed. This NP saw pt under direct supervision of Dr. Candi Corey.      Follow up:  Return in about 5 weeks (around 4/18/2022) for f/u after procedure and reassess pain/medications.     Rashawn Land, APRN - CNP

## 2022-03-14 NOTE — TELEPHONE ENCOUNTER
ORDER RECEIVED AGAIN TODAY AND PATIENT WAS SEEN FOR IN OFFICE VISIT  Jones Arredondo IS REQUIRED THROUGH AIM  SENT TO [address] [place] [address] TO Lavelle Glass

## 2022-03-17 ENCOUNTER — TELEPHONE (OUTPATIENT)
Dept: PAIN MANAGEMENT | Age: 42
End: 2022-03-17

## 2022-03-17 NOTE — TELEPHONE ENCOUNTER
BENEFITS: SI JOINT INJ    Insurance: [employer]  Phone: [phone]  Contact Name: Bernabe Costello  Effective Date: 3.1.2022     Plan year: YES-CALENDAR  Deductible: 500.00      Deductible Met: 1.11  Allowed/benefits paid at: 100% AFTER $20.00 COPAY  OOP: 3000.00 MET $101.95  Freq Limits: 20610--BASED ON MEDICAL NECESSITY  Prior Auth Requirement: AUTH IS REQUIRED THROUGH AIM--AUTH COMPLETED BY TB    Notes: NO PRE-EX CLAUSE    Call Reference #: [id number]    Time of call: 1:25PM

## 2022-03-21 ENCOUNTER — PROCEDURE VISIT (OUTPATIENT)
Dept: PAIN MANAGEMENT | Age: 42
End: 2022-03-21
Payer: COMMERCIAL

## 2022-03-21 DIAGNOSIS — M46.1 SACROILIITIS, NOT ELSEWHERE CLASSIFIED (HCC): Primary | ICD-10-CM

## 2022-03-21 PROCEDURE — 27096 INJECT SACROILIAC JOINT: CPT | Performed by: PAIN MEDICINE

## 2022-03-21 RX ORDER — LIDOCAINE HYDROCHLORIDE 10 MG/ML
10 INJECTION, SOLUTION EPIDURAL; INFILTRATION; INTRACAUDAL; PERINEURAL ONCE
Status: COMPLETED | OUTPATIENT
Start: 2022-03-21 | End: 2022-03-21

## 2022-03-21 RX ORDER — BETAMETHASONE SODIUM PHOSPHATE AND BETAMETHASONE ACETATE 3; 3 MG/ML; MG/ML
6 INJECTION, SUSPENSION INTRA-ARTICULAR; INTRALESIONAL; INTRAMUSCULAR; SOFT TISSUE ONCE
Status: COMPLETED | OUTPATIENT
Start: 2022-03-21 | End: 2022-03-21

## 2022-03-21 RX ADMIN — LIDOCAINE HYDROCHLORIDE 10 MG: 10 INJECTION, SOLUTION EPIDURAL; INFILTRATION; INTRACAUDAL; PERINEURAL at 15:55

## 2022-03-21 RX ADMIN — BETAMETHASONE SODIUM PHOSPHATE AND BETAMETHASONE ACETATE 6 MG: 3; 3 INJECTION, SUSPENSION INTRA-ARTICULAR; INTRALESIONAL; INTRAMUSCULAR; SOFT TISSUE at 15:56

## 2022-03-21 NOTE — PROGRESS NOTES
[facility]  Neurosurgery and Pain [address] [facility]: [phone]  F: [phone]      Patient Name: Flower Hurst  : 1980     Date:  3/21/2022      Physician: Mely George DO        Flower Hurst is here today for interventional pain management. Preoperatively, the patient presents with SI joint mediated pain, as per history and exam. Patient has failed NSAIDs, PT, and conservative treatment. Patient has significant psychological and functional impairment due to this condition. Standard ASIPP guidelines were followed and sterile technique used. Area was cleaned with Betadine x3. Informed consent was obtained. Fluoroscopic guidance was used for this procedure. S.I. JOINT:  Right  Appropriate length spinal needle was advanced to the S.I. Joint. Negative aspiration was achieved. In total, approximately 6mg of Betamethasone and 2ml of 1% preservative free Lidocaine was injected without difficulty. Patient tolerated the procedure well, no obvious complications occurred during the procedure. Patient was appropriately monitored and discharged home in stable condition with their usual motor strength. Post Op Instructions were given to patient. Relevant and recent imaging reviewed with patient today.                                       Mely George DO

## 2022-04-13 ENCOUNTER — OFFICE VISIT (OUTPATIENT)
Dept: PAIN MANAGEMENT | Age: 42
End: 2022-04-13
Payer: COMMERCIAL

## 2022-04-13 ENCOUNTER — TELEPHONE (OUTPATIENT)
Dept: PAIN MANAGEMENT | Age: 42
End: 2022-04-13

## 2022-04-13 VITALS
DIASTOLIC BLOOD PRESSURE: 79 MMHG | SYSTOLIC BLOOD PRESSURE: 137 MMHG | WEIGHT: 230 LBS | HEIGHT: 69 IN | BODY MASS INDEX: 34.07 KG/M2 | TEMPERATURE: 97.1 F | HEART RATE: 78 BPM

## 2022-04-13 DIAGNOSIS — G89.4 CHRONIC PAIN SYNDROME: ICD-10-CM

## 2022-04-13 DIAGNOSIS — Z79.891 ENCOUNTER FOR LONG-TERM OPIATE ANALGESIC USE: ICD-10-CM

## 2022-04-13 DIAGNOSIS — M46.1 SACROILIITIS, NOT ELSEWHERE CLASSIFIED (HCC): ICD-10-CM

## 2022-04-13 DIAGNOSIS — M54.16 LUMBAR RADICULOPATHY: ICD-10-CM

## 2022-04-13 DIAGNOSIS — M47.817 LUMBOSACRAL SPONDYLOSIS WITHOUT MYELOPATHY: Primary | ICD-10-CM

## 2022-04-13 PROCEDURE — 99214 OFFICE O/P EST MOD 30 MIN: CPT | Performed by: NURSE PRACTITIONER

## 2022-04-13 RX ORDER — TRAMADOL HYDROCHLORIDE 50 MG/1
50 TABLET ORAL 2 TIMES DAILY PRN
Qty: 45 TABLET | Refills: 0 | Status: SHIPPED | OUTPATIENT
Start: 2022-04-18 | End: 2022-05-18 | Stop reason: SDUPTHER

## 2022-04-13 ASSESSMENT — ENCOUNTER SYMPTOMS
EYES NEGATIVE: 1
SHORTNESS OF BREATH: 0
DIARRHEA: 0
GASTROINTESTINAL NEGATIVE: 1
COUGH: 0
BACK PAIN: 1
NAUSEA: 0
CONSTIPATION: 0

## 2022-04-13 NOTE — TELEPHONE ENCOUNTER
SK- RFA AUTH- RIGHT L2,3,4,5 X1- 4/25/2022 TO 5/6/2022     OK to schedule procedure approved as above. Please note sides/levels approved and date range.    (If applicable, sides/levels approved may differ from those ordered)    TO BE SCHEDULED WITH DR Kinjal Tate

## 2022-04-13 NOTE — TELEPHONE ENCOUNTER
AUTHORIZATION: RIGHT L2,3,4,5 RFA     INSURANCE: ANTOINETTE KILPATRICK RUTH VIA: [employer] PORTAL    Mukesh Quinonestheo #: [id number]    DATE RANGE: 4/25/2022 TO 5/6/2022    TELEPHONE CALL ROUTED TO MA TO SCHEDULE.

## 2022-04-13 NOTE — TELEPHONE ENCOUNTER
ORDER PLACED:    Date: 4/13/22  Description: RIGHT L2,3,4,5 RFA AFTER 4/21/22  Order Number: [id number]  Ordering Provider: Cole Tripathi  Performing Provider: [facility]  CPT Codes: 11575,58679  ICD10 Codes: J36.589    ORDER SENT TO [facility]

## 2022-04-13 NOTE — PROGRESS NOTES
Felipa Mayer  (1980)    4/13/2022    Subjective:     Felipa Mayer is 35 y.o. male who complains today of:    Chief Complaint   Patient presents with    Back Pain         Allergies:  Patient has no known allergies. History reviewed. No pertinent past medical history. History reviewed. No pertinent surgical history. History reviewed. No pertinent family history. Social History     Socioeconomic History    Marital status:      Spouse name: Not on file    Number of children: Not on file    Years of education: Not on file    Highest education level: Not on file   Occupational History    Not on file   Tobacco Use    Smoking status: Never Smoker    Smokeless tobacco: Never Used   Substance and Sexual Activity    Alcohol use: Yes     Alcohol/week: 0.0 standard drinks    Drug use: No    Sexual activity: Not on file   Other Topics Concern    Not on file   Social History Narrative    Not on file     Social Determinants of Health     Financial Resource Strain: Low Risk     Difficulty of Paying Living Expenses: Not hard at all   Food Insecurity: No Food Insecurity    Worried About Running Out of Food in the Last Year: Never true    [address] in the Last Year: Never true   Transportation Needs:     Lack of Transportation (Medical): Not on file    Lack of Transportation (Non-Medical):  Not on file   Physical Activity:     Days of Exercise per Week: Not on file    Minutes of Exercise per Session: Not on file   Stress:     Feeling of Stress : Not on file   Social Connections:     Frequency of Communication with Friends and Family: Not on file    Frequency of Social Gatherings with Friends and Family: Not on file    Attends Sikh Services: Not on file    Active Member of Clubs or Organizations: Not on file    Attends Club or Organization Meetings: Not on file    Marital Status: Not on file   Intimate Partner Violence:     Fear of Current or Ex-Partner: Not on file   Ibeth Benjamin Emotionally Abused: Not on file    Physically Abused: Not on file    Sexually Abused: Not on file   Housing Stability:     Unable to Pay for Housing in the Last Year: Not on file    Number of Places Lived in the Last Year: Not on file    Unstable Housing in the Last Year: Not on file       Current Outpatient Medications on File Prior to Visit   Medication Sig Dispense Refill    pregabalin (LYRICA) 75 MG capsule Take 1 capsule by mouth 2 times daily for 60 days. 60 capsule 1    traZODone (DESYREL) 50 MG tablet TAKE 1 TABLET DAILY AT BEDTIME (Patient not taking: Reported on 3/14/2022)      lidocaine (XYLOCAINE) 5 % ointment Apply topically as needed. Apply as directed BID PRN (Patient not taking: Reported on 3/14/2022) 1 Tube 2    testosterone enanthate (DELATESTRYL) 200 MG/ML injection Inject 1 mL into the muscle once for 1 dose. 1 ml every 2weeks 10 mL 0    Syringe/Needle, Disp, (SYRINGE 3CC/39VR0-3/2\") 22G X 1-1/2\" 3 ML MISC by Does not apply route. (Patient not taking: Reported on 3/14/2022) 20 each 06     No current facility-administered medications on file prior to visit. Pt presents today for a f/u of his pain. PCP is Dr. Benito Garner MD. Patient last saw Dr. Tonya Edge on 3/21/2022 for right SI joint injection. Says it was a [facility] relief\". He says he had >50% pain relief. No longer is daily pain in that area. Overall, now just a \"tightness\". He is ready to repeat RF ablation if able as well. Getting more low back pain.     On October 21, 2021 had right L2-3-4 5 RF ablation with significant relief. He says the Rt leg pain hasn't been too bad. He says his knee pain is manageable. He has chronic low back and right leg pain. Right leg pain goes down into the foot at times. He works full time. He has left lami with Dr Macey Graves years ago. Rt L5-S1 cornelius denied by his insurance - needs MRI/PT.   Hx Rt L5-S1 cornelius, SI joint injections and Rt L2,3,4,5 RF ablation in the past all with relief and to allow him to be more active. Uses a right knee brace at times. He is vaccinated against Covid.      He Lyrica 75mg BID - He says he does take this BID and feels it is helpful - he says he doesn't need this - had old Rx he was using - using apringly. He continues tramadol 50mg 1-2 tabs daily PRN. He gets trazadone at night from Dr. Femi Ryan -hasn't really need this at this time. Pt feels pain level with his medication is 0-3/10, and 5/10 without medication. Pt feels that prolonged position, work, bending, weather change makes the pain worse, and stretching, SI joint injection, RF ablation, medication makes the pain better. Pt feels his medication helps   him function and improve his quality of life, specifically says allows to work and stay active. Pt denies radiating numbness and tingling. Denies recent falls, injuries or trauma. Pt denies new weakness. Pt reports PT has been tried. Review of Systems   Constitutional: Negative for fever. HENT: Negative. Eyes: Negative. Respiratory: Negative for cough and shortness of breath. Cardiovascular: Negative for chest pain. Gastrointestinal: Negative. Negative for constipation, diarrhea and nausea. Endocrine: Negative. Genitourinary: Negative. Musculoskeletal: Positive for arthralgias and back pain. Skin: Negative. Neurological: Negative for dizziness and weakness. Psychiatric/Behavioral: Negative. Objective:     Vitals:  /79   Pulse 78   Temp 97.1 °F (36.2 °C)   Ht 5' 9\" (1.753 m)   Wt 230 lb (104.3 kg)   BMI 33.97 kg/m² Pain Score:   5      Physical Exam  Vitals and nursing note reviewed. This is a pleasant male who answers questions appropriately and follows commands.  Pt is alert and oriented x 3.  Recent and remote memory is intact.  Mood and affect, judgement and insight are normal.  No signs of distress, no dyspnea or SOB noted. HEENT: PERRL.  Neck is supple, trachea midline.  No lymphadenopathy noted.  Decreased ROM with flexion and extension of low back at extremes. Tender with palpation to lumbar spine with provocative maneuvers. Nontender over right SI joint with palpation.  Tightness appreciated over left lower lumbar paraspinal muscles.  Tattoos noted. Negative SLR.   Tightness in both hamstrings noted.  Rt knee without pain with palpation.  Full range of motion.    Able to ambulate without difficulty.    Balance and coordination normal.  Strength functional for ambulation. Cranial nerves II-XII are intact. Assessment:      Diagnosis Orders   1. Lumbosacral spondylosis without myelopathy  traMADol (ULTRAM) 50 MG tablet    [place] RADIOFREQUENCY NEUROTOMY LUMBAR OR SACRAL, W IMAGE GUIDANCE, SINGLE    [place] RADIOFREQ NEUROTOMY LUMBAR OR SACRAL, W IMAGE GUIDE,EA ADDL LEVEL    [place] RADIOFREQ NEUROTOMY LUMBAR OR SACRAL, W IMAGE GUIDE,EA ADDL LEVEL    CHG FLUOR NEEDLE/CATH SPINE/PARASPINAL DX/THER ADDON   2. Lumbar radiculopathy  traMADol (ULTRAM) 50 MG tablet   3. Chronic pain syndrome  traMADol (ULTRAM) 50 MG tablet   4. Sacroiliitis, not elsewhere classified (HCC)  traMADol (ULTRAM) 50 MG tablet   5. Encounter for long-term opiate analgesic use  traMADol (ULTRAM) 50 MG tablet       Plan:     Periodic Controlled Substance Monitoring: Possible medication side effects, risk of tolerance/dependence & alternative treatments discussed. ,No signs of potential drug abuse or diversion identified. ,Assessed functional status. ,Obtaining appropriate analgesic effect of treatment. (Carlee Rendon, APRN - CNP)    Orders Placed This Encounter   Medications    traMADol (ULTRAM) 50 MG tablet     Sig: Take 1 tablet by mouth 2 times daily as needed for Pain for up to 30 days. Can take 1-2 tabs daily PRN pain for 30 days. #45 tabs.   Fill date 4/18/22     Dispense:  45 tablet     Refill:  0     Reduce doses taken as pain becomes manageable       Orders Placed This Encounter   Procedures    CHG FLUOR NEEDLE/CATH SPINE/PARASPINAL DX/THER ADDON     Standing Status:   Future     Standing Expiration Date:   7/12/2022    [place] RADIOFREQUENCY NEUROTOMY LUMBAR OR SACRAL, W IMAGE GUIDANCE, SINGLE     Rt L2,3,4,5 RFA with SK after 4/21/22     Standing Status:   Future     Standing Expiration Date:   7/12/2022    MO RADIOFREQ NEUROTOMY LUMBAR OR SACRAL, W IMAGE GUIDE,EA ADDL LEVEL     Rt L2,3,4,5 RFA with SK after 4/21/22     Standing Status:   Future     Standing Expiration Date:   7/12/2022    MO RADIOFREQ NEUROTOMY LUMBAR OR SACRAL, W IMAGE GUIDE,EA ADDL LEVEL     Rt L2,3,4,5 RFA with SK after 4/21/22     Standing Status:   Future     Standing Expiration Date:   7/12/2022     Discussed options with the patient today. Anatomic model pathology was shown and reviewed with pt. We will go ahead and order  right L2, L3, L4, L5 RF ablation with Dr. Velasco Session after 4/21/22 as pt has had >80% pain relief with diagnostic MBB's and improvement with past RF ablations. he has failed conservative treatment in the past. Anatomic model of pathology was shown. Risks and benefits of the procedure were discussed. All questions were answered and patient understands and agrees with the plan. Continue Lyrica 75 mg he has enough and only uses sparingly he report,  and will continue his tramadol 50 mg 1-2 tabs daily as needed pain number 45 tablets for 30 days, as  this helps him function and get through work today he reports.  Letter for work he had appointment ok. Discussed home exercise program.  Relevant imaging and pain generators reviewed. Pt verbalized understanding and agrees with above plan. Pt has chronic pain. Utox reviewed and appropriate from July 2021. ORT score 1 - low risk. Narcan Rx declined. .    Will continue medications for chronic pain that has been previously directed as they do help pt function with ADL and improve quality of life.  Pt is aware goal is to use the least amount of medication possible to allow pt to function and help with quality of life as discussed in detail. Ideal to keep MME below 50 which it is. Have discussed proper disposal of narcotic medication. Advised to have medications in safe place and locked up/in lock box. Discussed possible risks of opiate medication with pt, including, but not limited to possible constipation, nausea or vomiting, sedation, urinary retention, dependence and possible addiction. Pt agrees to use medication as prescribed/as directed. Pt advised to not use opiates while driving or operating heavy equipment, or in situations where pt may harm him/herself or others. Pt is aware that while on narcotics, pt needs to be seen to reassess pain and reassess need for continued medication at that time. NDP reviewed. OARRS was reviewed. This NP saw pt under direct supervision of Dr. Piper Tellez. Follow up:  Return in about 5 weeks (around 5/18/2022) for f/u after procedure and reassess pain/medications.     Puja Land, APRN - CNP

## 2022-04-15 ENCOUNTER — TELEPHONE (OUTPATIENT)
Dept: PAIN MANAGEMENT | Age: 42
End: 2022-04-15

## 2022-04-15 NOTE — TELEPHONE ENCOUNTER
BENEFITS:  RIGHT L2,3,4,5 RFA    Insurance: [employer]TE  Phone: [phone]  Contact Name: Brunilda Valladares  Effective Date: 3.1.2022     Plan year: YES-CALENDAR  Deductible: 500.00      Deductible Met: 10.34  Allowed/benefits paid at: 100% AFTER DEDUCTIBLE  OOP: 3000.00 MET $176.68  Freq Limits: 26015 & 64636--BASED ON MEDICAL NECESSITY  Prior Auth Requirement: NO AUTH REQUIRED    Notes: NO PRE-EX CLAUSE    Call Reference #: [id number]    Time of call: 11:55AM

## 2022-04-25 ENCOUNTER — OFFICE VISIT (OUTPATIENT)
Dept: PAIN MANAGEMENT | Age: 42
End: 2022-04-25
Payer: COMMERCIAL

## 2022-04-25 DIAGNOSIS — M47.817 LUMBOSACRAL SPONDYLOSIS WITHOUT MYELOPATHY: ICD-10-CM

## 2022-04-25 DIAGNOSIS — M46.1 SACROILIITIS, NOT ELSEWHERE CLASSIFIED (HCC): ICD-10-CM

## 2022-04-25 PROCEDURE — 64635 DESTROY LUMB/SAC FACET JNT: CPT | Performed by: PAIN MEDICINE

## 2022-04-25 PROCEDURE — 64636 DESTROY L/S FACET JNT ADDL: CPT | Performed by: PAIN MEDICINE

## 2022-04-25 NOTE — PROGRESS NOTES
[facility]) Physicians  Neurosurgery and Pain [address] [facility]: [phone]  F: [phone]      Lumbar Radio Frequency Ablation     Provider: Ava Campbell DO          Patient Name: Austin Garcia : 1980        Date: 2022      Austin Garcia is here today for interventional pain management. Standard ASIPP guidelines were followed and sterile technique used. Area was cleaned with Betadine x3. Informed consent was obtained. Fluoroscopic guidance was used for this procedure. Multiple views of fluoroscopy were used during procedure to assist with needle placement. Appropriate sized RF 10mm active tip needle was used and advance to appropriate anatomic location. There was appropriate multifidus contraction noted with motor stimulation at 2 Hz between 0.5-1.5 volts. No limb or gluteal contraction was noted taking it up to 3.5 volts. Prior to lesioning at 80 degrees Celsius for 90 seconds, approximately 0.75mg/1mg of Celestone and ½ cc of 1% preservative free Lidocaine was injected. Impedance was between 200-500 ohms during the procedure. Patient tolerated the procedure well, no obvious complications occurred during the procedure. Patient was appropriately monitored and discharged home in stable condition with their usual motor strength. Post Op instructions were given to patient.           [] Bilateral [] T11 [] L1 [] S1     [] T12 [] L2 [] S2    [x] Right  [x] L3 [] S3      [x] L4 [] S4    [] Left  [x] L5                              Ava Campbell DO

## 2022-05-05 RX ORDER — BETAMETHASONE SODIUM PHOSPHATE AND BETAMETHASONE ACETATE 3; 3 MG/ML; MG/ML
3 INJECTION, SUSPENSION INTRA-ARTICULAR; INTRALESIONAL; INTRAMUSCULAR; SOFT TISSUE ONCE
Status: COMPLETED | OUTPATIENT
Start: 2022-05-05 | End: 2022-05-05

## 2022-05-05 RX ADMIN — BETAMETHASONE SODIUM PHOSPHATE AND BETAMETHASONE ACETATE 3 MG: 3; 3 INJECTION, SUSPENSION INTRA-ARTICULAR; INTRALESIONAL; INTRAMUSCULAR; SOFT TISSUE at 08:27

## 2022-05-18 ENCOUNTER — OFFICE VISIT (OUTPATIENT)
Dept: PAIN MANAGEMENT | Age: 42
End: 2022-05-18
Payer: COMMERCIAL

## 2022-05-18 VITALS
TEMPERATURE: 96.3 F | DIASTOLIC BLOOD PRESSURE: 78 MMHG | WEIGHT: 230 LBS | HEIGHT: 69 IN | BODY MASS INDEX: 34.07 KG/M2 | SYSTOLIC BLOOD PRESSURE: 120 MMHG

## 2022-05-18 DIAGNOSIS — M54.16 LUMBAR RADICULOPATHY: ICD-10-CM

## 2022-05-18 DIAGNOSIS — G89.4 CHRONIC PAIN SYNDROME: ICD-10-CM

## 2022-05-18 DIAGNOSIS — M46.1 SACROILIITIS, NOT ELSEWHERE CLASSIFIED (HCC): ICD-10-CM

## 2022-05-18 DIAGNOSIS — Z79.891 ENCOUNTER FOR LONG-TERM OPIATE ANALGESIC USE: ICD-10-CM

## 2022-05-18 DIAGNOSIS — M47.817 LUMBOSACRAL SPONDYLOSIS WITHOUT MYELOPATHY: ICD-10-CM

## 2022-05-18 PROCEDURE — 99213 OFFICE O/P EST LOW 20 MIN: CPT | Performed by: NURSE PRACTITIONER

## 2022-05-18 RX ORDER — TRAMADOL HYDROCHLORIDE 50 MG/1
50 TABLET ORAL 2 TIMES DAILY PRN
Qty: 45 TABLET | Refills: 0 | Status: SHIPPED | OUTPATIENT
Start: 2022-05-19 | End: 2022-06-21 | Stop reason: SDUPTHER

## 2022-05-18 ASSESSMENT — ENCOUNTER SYMPTOMS
EYES NEGATIVE: 1
SHORTNESS OF BREATH: 0
NAUSEA: 0
CONSTIPATION: 0
COUGH: 0
GASTROINTESTINAL NEGATIVE: 1
DIARRHEA: 0
BACK PAIN: 1

## 2022-05-18 NOTE — PROGRESS NOTES
Bebe Villalobos  (1980)    5/18/2022    Subjective:     Bebe Villalobos is 35 y.o. male who complains today of:    Chief Complaint   Patient presents with    Back Pain         Allergies:  Patient has no known allergies. History reviewed. No pertinent past medical history. History reviewed. No pertinent surgical history. History reviewed. No pertinent family history. Social History     Socioeconomic History    Marital status:      Spouse name: Not on file    Number of children: Not on file    Years of education: Not on file    Highest education level: Not on file   Occupational History    Not on file   Tobacco Use    Smoking status: Never Smoker    Smokeless tobacco: Never Used   Substance and Sexual Activity    Alcohol use: Yes     Alcohol/week: 0.0 standard drinks    Drug use: No    Sexual activity: Not on file   Other Topics Concern    Not on file   Social History Narrative    Not on file     Social Determinants of Health     Financial Resource Strain: Low Risk     Difficulty of Paying Living Expenses: Not hard at all   Food Insecurity: No Food Insecurity    Worried About Running Out of Food in the Last Year: Never true    [address] in the Last Year: Never true   Transportation Needs:     Lack of Transportation (Medical): Not on file    Lack of Transportation (Non-Medical):  Not on file   Physical Activity:     Days of Exercise per Week: Not on file    Minutes of Exercise per Session: Not on file   Stress:     Feeling of Stress : Not on file   Social Connections:     Frequency of Communication with Friends and Family: Not on file    Frequency of Social Gatherings with Friends and Family: Not on file    Attends Islam Services: Not on file    Active Member of Clubs or Organizations: Not on file    Attends Club or Organization Meetings: Not on file    Marital Status: Not on file   Intimate Partner Violence:     Fear of Current or Ex-Partner: Not on file   Zainab Emotionally Abused: Not on file    Physically Abused: Not on file    Sexually Abused: Not on file   Housing Stability:     Unable to Pay for Housing in the Last Year: Not on file    Number of Places Lived in the Last Year: Not on file    Unstable Housing in the Last Year: Not on file       Current Outpatient Medications on File Prior to Visit   Medication Sig Dispense Refill    pregabalin (LYRICA) 75 MG capsule Take 1 capsule by mouth 2 times daily for 60 days. 60 capsule 1    testosterone enanthate (DELATESTRYL) 200 MG/ML injection Inject 1 mL into the muscle once for 1 dose. 1 ml every 2weeks 10 mL 0     No current facility-administered medications on file prior to visit. Pt presents today for a f/u of his pain. PCP is Dr. Jimmy Ferreira MD. Patient last saw Dr. Malinda Jarvis on 4/25/2022 for right L3-4-5 RF ablation. Says already has been getting >50% pain relief and also able to move better. On 3/21/2022 for right SI joint injection with significant relief. No longer is daily pain in that area. Overall, now just a \"tightness\". He is ready to repeat RF ablation if able as well. Getting more low back pain.     He says the Rt leg pain hasn't been too bad. He says his knee pain is manageable. He has chronic low back and right leg pain. Right leg pain goes down into the foot at times. He works full time. He has left lami with Dr Lakeshia Lopes years ago. Rt L5-S1 cornelius denied by his insurance - needs MRI/PT. Hx Rt L5-S1 cornelius, SI joint injections and Rt L2,3,4,5 RF ablation in the past all with relief and to allow him to be more active. Uses a right knee brace at times. He is vaccinated against Covid.      He Lyrica 75mg BID - He says he does take this BID and feels it is helpful - he says he doesn't need this - had old Rx he was using - using apringly. He continues tramadol 50mg 1-2 tabs daily PRN. He stopped trazadone at night from Dr. Jimmy Ferreira.     Pt feels pain level with his medication is 3/10, and worse without medication. Pt feels that stress, work, weather change makes the pain worse, and medication, RF ablation makes the pain better. Pt feels his medication helps   him function and improve his quality of life, specifically says allows to work and do ADL's. Pt denies radiating numbness and tingling. Denies recent falls, injuries or trauma. Pt denies new weakness. Pt reports PT has been tried. Review of Systems   Constitutional: Negative for fever. HENT: Negative. Eyes: Negative. Respiratory: Negative for cough and shortness of breath. Cardiovascular: Negative for chest pain. Gastrointestinal: Negative. Negative for constipation, diarrhea and nausea. Endocrine: Negative. Genitourinary: Negative. Musculoskeletal: Positive for arthralgias and back pain. Skin: Negative. Neurological: Negative for dizziness and weakness. Psychiatric/Behavioral: Negative. Objective:     Vitals:  /78 (Site: Right Upper Arm, Position: Sitting)   Temp 96.3 °F (35.7 °C) (Temporal)   Ht 5' 9\" (1.753 m)   Wt 230 lb (104.3 kg)   BMI 33.97 kg/m² Pain Score:   3      Physical Exam  Vitals and nursing note reviewed. This is a pleasant male who answers questions appropriately and follows commands.  Pt is alert and oriented x 3.  Recent and remote memory is intact.  Mood and affect, judgement and insight are normal.  No signs of distress, no dyspnea or SOB noted. HEENT: PERRL.  Neck is supple, trachea midline.  No lymphadenopathy noted.  Decreased ROM with flexion and extension of low back at extremes. Not too tender with palpation to lumbar spine. Nontender over right SI joint with palpation.  Tightness appreciated over left lower lumbar paraspinal muscles.  Tattoos noted. Negative SLR.   Tightness in both hamstrings noted.  Rt knee without pain with palpation.  Crepitus with ROM of Lt knee.  Full range of motion.    Able to ambulate without difficulty.    Balance and coordination normal.  Strength functional for ambulation. Cranial nerves II-XII are intact. Assessment:      Diagnosis Orders   1. Lumbosacral spondylosis without myelopathy  traMADol (ULTRAM) 50 MG tablet   2. Lumbar radiculopathy  traMADol (ULTRAM) 50 MG tablet   3. Chronic pain syndrome  traMADol (ULTRAM) 50 MG tablet   4. Sacroiliitis, not elsewhere classified (HCC)  traMADol (ULTRAM) 50 MG tablet   5. Encounter for long-term opiate analgesic use  traMADol (ULTRAM) 50 MG tablet       Plan:     Periodic Controlled Substance Monitoring: Possible medication side effects, risk of tolerance/dependence & alternative treatments discussed. ,No signs of potential drug abuse or diversion identified. ,Assessed functional status. ,Obtaining appropriate analgesic effect of treatment. (Carlee Rendon, APRN - CNP)    Orders Placed This Encounter   Medications    traMADol (ULTRAM) 50 MG tablet     Sig: Take 1 tablet by mouth 2 times daily as needed for Pain for up to 30 days. Can take 1-2 tabs daily PRN pain for 30 days. #45 tabs. Fill date 5/19/22     Dispense:  45 tablet     Refill:  0     Reduce doses taken as pain becomes manageable       No orders of the defined types were placed in this encounter. Discussed options with the patient today. Anatomic model pathology was shown and reviewed with pt. He is doing well status post RF ablation. Hopefully this is lasting. At this time we will continue his tramadol 50 mg 1-2 tabs daily as needed pain #45 tablets for 30 days, as  this helps him function and get through work today he reports.  Letter for work he had appointment ok to give. Continue lyrica 75mg which he uses sparingly - has enough he says. Hold injections. All questions were answered. Discussed home exercise program.  Relevant imaging and pain generators reviewed. Pt verbalized understanding and agrees with above plan. Pt has chronic pain. Utox reviewed and appropriate from July 2021. ORT score 1 - low risk.  Narcan Rx

## 2022-06-21 ENCOUNTER — OFFICE VISIT (OUTPATIENT)
Dept: PAIN MANAGEMENT | Age: 42
End: 2022-06-21
Payer: COMMERCIAL

## 2022-06-21 VITALS
SYSTOLIC BLOOD PRESSURE: 124 MMHG | DIASTOLIC BLOOD PRESSURE: 82 MMHG | BODY MASS INDEX: 32.58 KG/M2 | HEIGHT: 69 IN | WEIGHT: 220 LBS | TEMPERATURE: 97.6 F

## 2022-06-21 DIAGNOSIS — M46.1 SACROILIITIS, NOT ELSEWHERE CLASSIFIED (HCC): ICD-10-CM

## 2022-06-21 DIAGNOSIS — M54.16 LUMBAR RADICULOPATHY: ICD-10-CM

## 2022-06-21 DIAGNOSIS — M47.817 LUMBOSACRAL SPONDYLOSIS WITHOUT MYELOPATHY: ICD-10-CM

## 2022-06-21 DIAGNOSIS — G89.4 CHRONIC PAIN SYNDROME: ICD-10-CM

## 2022-06-21 DIAGNOSIS — Z79.891 ENCOUNTER FOR LONG-TERM OPIATE ANALGESIC USE: ICD-10-CM

## 2022-06-21 PROCEDURE — 99213 OFFICE O/P EST LOW 20 MIN: CPT | Performed by: NURSE PRACTITIONER

## 2022-06-21 RX ORDER — TRAMADOL HYDROCHLORIDE 50 MG/1
50 TABLET ORAL 2 TIMES DAILY PRN
Qty: 45 TABLET | Refills: 0 | Status: SHIPPED | OUTPATIENT
Start: 2022-06-21 | End: 2022-07-29 | Stop reason: SDUPTHER

## 2022-06-21 ASSESSMENT — ENCOUNTER SYMPTOMS
BACK PAIN: 1
CONSTIPATION: 0
NAUSEA: 0
EYES NEGATIVE: 1
DIARRHEA: 0
SHORTNESS OF BREATH: 0
GASTROINTESTINAL NEGATIVE: 1
COUGH: 0

## 2022-06-21 NOTE — PROGRESS NOTES
Ria Davison  (1980)    6/21/2022    Subjective:     Ria Davison is 38 y.o. male who complains today of:    Chief Complaint   Patient presents with    Back Pain    Leg Pain         Allergies:  Patient has no known allergies. History reviewed. No pertinent past medical history. History reviewed. No pertinent surgical history. History reviewed. No pertinent family history. Social History     Socioeconomic History    Marital status:      Spouse name: Not on file    Number of children: Not on file    Years of education: Not on file    Highest education level: Not on file   Occupational History    Not on file   Tobacco Use    Smoking status: Never Smoker    Smokeless tobacco: Never Used   Substance and Sexual Activity    Alcohol use: Yes     Alcohol/week: 0.0 standard drinks    Drug use: No    Sexual activity: Not on file   Other Topics Concern    Not on file   Social History Narrative    Not on file     Social Determinants of Health     Financial Resource Strain: Low Risk     Difficulty of Paying Living Expenses: Not hard at all   Food Insecurity: No Food Insecurity    Worried About Running Out of Food in the Last Year: Never true    [address] in the Last Year: Never true   Transportation Needs:     Lack of Transportation (Medical): Not on file    Lack of Transportation (Non-Medical):  Not on file   Physical Activity:     Days of Exercise per Week: Not on file    Minutes of Exercise per Session: Not on file   Stress:     Feeling of Stress : Not on file   Social Connections:     Frequency of Communication with Friends and Family: Not on file    Frequency of Social Gatherings with Friends and Family: Not on file    Attends Baptism Services: Not on file    Active Member of Clubs or Organizations: Not on file    Attends Club or Organization Meetings: Not on file    Marital Status: Not on file   Intimate Partner Violence:     Fear of Current or Ex-Partner: Not on file    Emotionally Abused: Not on file    Physically Abused: Not on file    Sexually Abused: Not on file   Housing Stability:     Unable to Pay for Housing in the Last Year: Not on file    Number of Places Lived in the Last Year: Not on file    Unstable Housing in the Last Year: Not on file       Current Outpatient Medications on File Prior to Visit   Medication Sig Dispense Refill    pregabalin (LYRICA) 75 MG capsule Take 1 capsule by mouth 2 times daily for 60 days. 60 capsule 1    testosterone enanthate (DELATESTRYL) 200 MG/ML injection Inject 1 mL into the muscle once for 1 dose. 1 ml every 2weeks 10 mL 0     No current facility-administered medications on file prior to visit. Pt presents today for a f/u of his pain. PCP is Dr. Shahbaz Colon MD. Patient last saw Dr. Timothy Mills on 4/25/2022 for right L3-4-5 RF ablation. He feels this is still helping significantly. On 3/21/2022 for right SI joint injection with significant relief. No longer is daily pain in that area. Overall, now just a \"tightness\". He is ready to repeat RF ablation if able as well. Getting more low back pain.     He says the Rt leg pain hasn't been too bad. He says his knee pain is manageable. He has chronic low back and right leg pain. Right leg pain goes down into the foot at times. He works full time. He has left lami with Dr Jazmin Apodaca years ago. Rt L5-S1 cornelius denied by his insurance - needs MRI/PT. Hx Rt L5-S1 cornelius, SI joint injections and Rt L2,3,4,5 RF ablation in the past all with relief and to allow him to be more active. Uses a right knee brace at times. He is vaccinated against Covid.      He Lyrica 75mg BID - He says he does take this BID and feels it is helpful - he says he doesn't need this - had old Rx he was using - using sparingly. He continues tramadol 50mg 1-2 tabs daily PRN. Pt feels pain level with his medication is 3/10, and worse without medication.   Pt feels that too much activity, stress makes the pain worse, and warmer weather, medication makes the pain better. Pt feels his medication helps   him function and improve his quality of life, specifically says allows to do ADL's. Pt denies radiating numbness and tingling. Denies recent falls, injuries or trauma. Pt denies new weakness. Pt reports PT has been done in the past.         Review of Systems   Constitutional: Negative for fever. HENT: Negative. Eyes: Negative. Respiratory: Negative for cough and shortness of breath. Cardiovascular: Negative for chest pain. Gastrointestinal: Negative. Negative for constipation, diarrhea and nausea. Endocrine: Negative. Genitourinary: Negative. Musculoskeletal: Positive for arthralgias, back pain and neck pain. Skin: Negative. Neurological: Negative for dizziness and weakness. Psychiatric/Behavioral: Negative. Objective:     Vitals:  /82 (Site: Left Upper Arm, Position: Sitting)   Temp 97.6 °F (36.4 °C)   Ht 5' 9\" (1.753 m)   Wt 220 lb (99.8 kg)   BMI 32.49 kg/m² Pain Score:   4      Physical Exam  Vitals and nursing note reviewed. This is a pleasant male who answers questions appropriately and follows commands.  Pt is alert and oriented x 3.  Recent and remote memory is intact.  Mood and affect, judgement and insight are normal.  No signs of distress, no dyspnea or SOB noted. HEENT: PERRL.  Neck is supple, trachea midline.  No lymphadenopathy noted.  Decreased ROM with flexion and extension of low back at extremes. Not too tender with palpation to lumbar spine.  Tightness appreciated over left lower lumbar paraspinal muscles.  Tattoos noted. Negative SLR.   Tightness in both hamstrings noted.  Rt knee without pain with palpation.  Crepitus with ROM of Lt knee.  Full range of motion.    Able to ambulate without difficulty.    Balance and coordination normal.  Strength functional for ambulation. Cranial nerves II-XII are intact.         Assessment:      Diagnosis Orders   1. Lumbosacral spondylosis without myelopathy  traMADol (ULTRAM) 50 MG tablet    Urine Drug Screen   2. Lumbar radiculopathy  traMADol (ULTRAM) 50 MG tablet    Urine Drug Screen   3. Chronic pain syndrome  traMADol (ULTRAM) 50 MG tablet    Urine Drug Screen   4. Sacroiliitis, not elsewhere classified (HCC)  traMADol (ULTRAM) 50 MG tablet    Urine Drug Screen   5. Encounter for long-term opiate analgesic use  traMADol (ULTRAM) 50 MG tablet    Urine Drug Screen       Plan:     Periodic Controlled Substance Monitoring: Possible medication side effects, risk of tolerance/dependence & alternative treatments discussed. ,No signs of potential drug abuse or diversion identified. ,Assessed functional status. ,Obtaining appropriate analgesic effect of treatment. (Carlee Rendon, APRN - CNP)    Orders Placed This Encounter   Medications    traMADol (ULTRAM) 50 MG tablet     Sig: Take 1 tablet by mouth 2 times daily as needed for Pain for up to 30 days. Can take 1-2 tabs daily PRN pain for 30 days. #45 tabs. Dispense:  45 tablet     Refill:  0     Reduce doses taken as pain becomes manageable       Orders Placed This Encounter   Procedures    Urine Drug Screen     Chronic pain/illicits     Discussed options with the patient today. Anatomic model pathology was shown and reviewed with pt. Will continue his tramadol 50 mg 1-2 tabs daily as needed pain #45 tablets for 30 days, as  this helps him function and get through work today he reports.  Letter for work he had appointment ok to give. Continue lyrica 75mg which he uses sparingly - has enough he says. Hold injections. Pleased the RF ablation and SI joint injections continue to offer relief. All questions were answered. Discussed home exercise program.  Relevant imaging and pain generators reviewed. Pt verbalized understanding and agrees with above plan. Pt has chronic pain. Utox reviewed and appropriate from July 2021. ORT score 1 - low risk.  Narcan Rx declined. Will check Utox and f/u with report - pt says he took tramadol today. Will continue medications for chronic pain that has been previously directed as they do help pt function with ADL and improve quality of life. Pt is aware goal is to use the least amount of medication possible to allow pt to function and help with quality of life as discussed in detail. Ideal to keep MME below 50 which it is. Have discussed proper disposal of narcotic medication. Advised to have medications in safe place and locked up/in lock box. Discussed possible risks of opiate medication with pt, including, but not limited to possible constipation, nausea or vomiting, sedation, urinary retention, dependence and possible addiction. Pt agrees to use medication as prescribed/as directed. Pt advised to not use opiates while driving or operating heavy equipment, or in situations where pt may harm him/herself or others. Pt is aware that while on narcotics, pt needs to be seen to reassess pain and reassess need for continued medication at that time. NDP reviewed. OARRS was reviewed. This NP saw pt under direct supervision of Dr. Radha Devlin. Follow up:  Return in about 4 weeks (around 7/19/2022) for review meds and reassess pain.     Alanis Land, APRN - CNP

## 2022-06-23 LAB
6-ACETYLMORPHINE, UR: NORMAL
AMPHETAMINE SCREEN, URINE: NORMAL
BARBITURATE SCREEN, URINE: NORMAL
BENZODIAZEPINE SCREEN, URINE: NORMAL
CANNABINOID SCREEN URINE: NORMAL
COCAINE METABOLITE, URINE: NORMAL
CREATININE URINE: NORMAL
EDDP, URINE: NORMAL
ETHANOL URINE: NORMAL
MDMA URINE: NORMAL
METHADONE SCREEN, URINE: NORMAL
METHAMPHETAMINE, URINE: NORMAL
OPIATES, URINE: NORMAL
OXYCODONE: NORMAL
PCP: NORMAL
PH, URINE: NORMAL
PHENCYCLIDINE, URINE: NORMAL
PROPOXYPHENE, URINE: NORMAL
TRICYCLIC ANTIDEPRESSANTS, UR: NORMAL

## 2022-07-29 ENCOUNTER — OFFICE VISIT (OUTPATIENT)
Dept: PAIN MANAGEMENT | Age: 42
End: 2022-07-29
Payer: COMMERCIAL

## 2022-07-29 VITALS
TEMPERATURE: 96.7 F | SYSTOLIC BLOOD PRESSURE: 122 MMHG | WEIGHT: 230 LBS | BODY MASS INDEX: 34.07 KG/M2 | HEIGHT: 69 IN | DIASTOLIC BLOOD PRESSURE: 80 MMHG

## 2022-07-29 DIAGNOSIS — M46.1 SACROILIITIS, NOT ELSEWHERE CLASSIFIED (HCC): ICD-10-CM

## 2022-07-29 DIAGNOSIS — M54.16 LUMBAR RADICULOPATHY: ICD-10-CM

## 2022-07-29 DIAGNOSIS — M47.817 LUMBOSACRAL SPONDYLOSIS WITHOUT MYELOPATHY: ICD-10-CM

## 2022-07-29 DIAGNOSIS — G89.4 CHRONIC PAIN SYNDROME: ICD-10-CM

## 2022-07-29 DIAGNOSIS — Z79.891 ENCOUNTER FOR LONG-TERM OPIATE ANALGESIC USE: ICD-10-CM

## 2022-07-29 PROCEDURE — 99214 OFFICE O/P EST MOD 30 MIN: CPT | Performed by: NURSE PRACTITIONER

## 2022-07-29 RX ORDER — TRAMADOL HYDROCHLORIDE 50 MG/1
50 TABLET ORAL DAILY PRN
Qty: 30 TABLET | Refills: 0 | Status: SHIPPED | OUTPATIENT
Start: 2022-07-29 | End: 2022-08-29 | Stop reason: SDUPTHER

## 2022-07-29 ASSESSMENT — ENCOUNTER SYMPTOMS
DIARRHEA: 0
SHORTNESS OF BREATH: 0
GASTROINTESTINAL NEGATIVE: 1
EYES NEGATIVE: 1
BACK PAIN: 1
NAUSEA: 0
COUGH: 0
CONSTIPATION: 0

## 2022-07-29 NOTE — PROGRESS NOTES
Isabella Cruz  (1980)    7/29/2022    Subjective:     Isabella Cruz is 38 y.o. male who complains today of:    Chief Complaint   Patient presents with    Back Pain    Leg Pain         Allergies:  Patient has no known allergies. History reviewed. No pertinent past medical history. History reviewed. No pertinent surgical history. History reviewed. No pertinent family history. Social History     Socioeconomic History    Marital status:      Spouse name: Not on file    Number of children: Not on file    Years of education: Not on file    Highest education level: Not on file   Occupational History    Not on file   Tobacco Use    Smoking status: Never    Smokeless tobacco: Never   Substance and Sexual Activity    Alcohol use: Yes     Alcohol/week: 0.0 standard drinks    Drug use: No    Sexual activity: Not on file   Other Topics Concern    Not on file   Social History Narrative    Not on file     Social Determinants of Health     Financial Resource Strain: Low Risk     Difficulty of Paying Living Expenses: Not hard at all   Food Insecurity: No Food Insecurity    Worried About Running Out of Food in the Last Year: Never true    Ran Out of Food in the Last Year: Never true   Transportation Needs: Not on file   Physical Activity: Not on file   Stress: Not on file   Social Connections: Not on file   Intimate Partner Violence: Not on file   Housing Stability: Not on file       Current Outpatient Medications on File Prior to Visit   Medication Sig Dispense Refill    testosterone enanthate (DELATESTRYL) 200 MG/ML injection Inject 1 mL into the muscle once for 1 dose. 1 ml every 2weeks 10 mL 0     No current facility-administered medications on file prior to visit. Pt presents today for a f/u of his pain. PCP is Dr. Lorette Phalen, MD. Patient last saw this NP. It appears he recently got a medical marijuana card according to [employer]. Last U tox had low levels THC with his tramadol.   He says he got this about 2 months ago - helps with his anxiety. He thinks he can go down to 1 tramadol a day. Seen Dr. Mairah Champagne on 4/25/2022 for right L3-4-5 RF ablation. - helped significantly. On 3/21/2022 for right SI joint injection with significant relief. No longer is daily pain in that area. Overall, now just a \"tightness\". He says the Rt leg pain hasn't been too bad. He says his knee pain is manageable. He has chronic low back and right leg pain. Right leg pain goes down into the foot at times. He works full time. He has left lami with Dr David Stephens years ago. Rt L5-S1 cornelius denied by his insurance - needs MRI/PT. Hx Rt L5-S1 cornelius, SI joint injections and Rt L2,3,4,5 RF ablation in the past all with relief and to allow him to be more active. Uses a right knee brace at times. He is vaccinated against Covid. He has not been using his Lyrica 75mg. He continues tramadol 50mg 1-2 tabs daily PRN. Pt feels pain level with his medication is 0/10, and 3/10 without medication. Pt feels that work, prolonged being on feet makes the pain worse, and medication, RF ablation, medical THC makes the pain better. Pt feels his medication helps   him function and improve his quality of life, specifically says allows to work. Pt denies radiating numbness and tingling. Denies recent falls, injuries or trauma. Pt denies new weakness. Pt reports PT has been done in the past.         Review of Systems   Constitutional:  Negative for fever. HENT: Negative. Eyes: Negative. Respiratory:  Negative for cough and shortness of breath. Cardiovascular:  Negative for chest pain. Gastrointestinal: Negative. Negative for constipation, diarrhea and nausea. Endocrine: Negative. Genitourinary: Negative. Musculoskeletal:  Positive for arthralgias, back pain and neck pain. Skin: Negative. Neurological:  Negative for dizziness and weakness. Psychiatric/Behavioral: Negative.          Objective:     Vitals:  /80 (Site: Refill:  0     Reduce doses taken as pain becomes manageable       No orders of the defined types were placed in this encounter. Discussed options with the patient today. Anatomic model pathology was shown and reviewed with pt. Will continue his tramadol 50 mg but reduce to daily PRN pain as  this helps him function and get through work. He is now using medical marijuana with some relief as well. All questions were answered. Discussed home exercise program.  Relevant imaging and pain generators reviewed. Pt verbalized understanding and agrees with above plan. Pt has chronic pain. Utox reviewed from June 2022 - appropriate for tramadol but also low levels of THC. ORT score 1 - low risk. Narcan Rx declined    Will continue medications for chronic pain that has been previously directed as they do help pt function with ADL and improve quality of life. Pt is aware goal is to use the least amount of medication possible to allow pt to function and help with quality of life as discussed in detail. Ideal to keep MME below 50 which it is. Have discussed proper disposal of narcotic medication. Advised to have medications in safe place and locked up/in lock box. Discussed possible risks of opiate medication with pt, including, but not limited to possible constipation, nausea or vomiting, sedation, urinary retention, dependence and possible addiction. Pt agrees to use medication as prescribed/as directed. Pt advised to not use opiates while driving or operating heavy equipment, or in situations where pt may harm him/herself or others. Pt is aware that while on narcotics, pt needs to be seen to reassess pain and reassess need for continued medication at that time. NDP reviewed. OARRS was reviewed. This NP saw pt under direct supervision of Dr. Nydai Dykes. Follow up:  Return in about 4 weeks (around 8/26/2022) for review meds and reassess pain.     Kallie Land, APRN - CNP

## 2022-08-29 DIAGNOSIS — M47.817 LUMBOSACRAL SPONDYLOSIS WITHOUT MYELOPATHY: ICD-10-CM

## 2022-08-29 DIAGNOSIS — M54.16 LUMBAR RADICULOPATHY: ICD-10-CM

## 2022-08-29 DIAGNOSIS — G89.4 CHRONIC PAIN SYNDROME: ICD-10-CM

## 2022-08-29 DIAGNOSIS — Z79.891 ENCOUNTER FOR LONG-TERM OPIATE ANALGESIC USE: ICD-10-CM

## 2022-08-29 DIAGNOSIS — M46.1 SACROILIITIS, NOT ELSEWHERE CLASSIFIED (HCC): ICD-10-CM

## 2022-08-29 RX ORDER — TRAMADOL HYDROCHLORIDE 50 MG/1
50 TABLET ORAL DAILY PRN
Qty: 30 TABLET | Refills: 0 | Status: SHIPPED | OUTPATIENT
Start: 2022-08-29 | End: 2022-09-12 | Stop reason: SDUPTHER

## 2022-08-29 NOTE — TELEPHONE ENCOUNTER
Requested Prescriptions     Pending Prescriptions Disp Refills    traMADol (ULTRAM) 50 MG tablet 30 tablet 0     Sig: Take 1 tablet by mouth daily as needed for Pain for up to 30 days.        Patient last seen on:  7/29/22  Date of last surgery:  n/a  Date of last refill:  7/29/22  Pain level:  n/a  Patient complaining of:  pt requesting refill/ had to resched appt per our office  Future appts: 9/12/22

## 2022-09-12 ENCOUNTER — OFFICE VISIT (OUTPATIENT)
Dept: PAIN MANAGEMENT | Age: 42
End: 2022-09-12
Payer: COMMERCIAL

## 2022-09-12 VITALS
DIASTOLIC BLOOD PRESSURE: 76 MMHG | SYSTOLIC BLOOD PRESSURE: 134 MMHG | TEMPERATURE: 98.3 F | BODY MASS INDEX: 34.07 KG/M2 | HEIGHT: 69 IN | WEIGHT: 230 LBS

## 2022-09-12 DIAGNOSIS — M25.511 ACUTE PAIN OF RIGHT SHOULDER: ICD-10-CM

## 2022-09-12 DIAGNOSIS — M54.16 LUMBAR RADICULOPATHY: ICD-10-CM

## 2022-09-12 DIAGNOSIS — Z79.891 ENCOUNTER FOR LONG-TERM OPIATE ANALGESIC USE: ICD-10-CM

## 2022-09-12 DIAGNOSIS — M46.1 SACROILIITIS, NOT ELSEWHERE CLASSIFIED (HCC): Primary | ICD-10-CM

## 2022-09-12 DIAGNOSIS — M47.817 LUMBOSACRAL SPONDYLOSIS WITHOUT MYELOPATHY: ICD-10-CM

## 2022-09-12 DIAGNOSIS — G89.4 CHRONIC PAIN SYNDROME: ICD-10-CM

## 2022-09-12 PROCEDURE — 99214 OFFICE O/P EST MOD 30 MIN: CPT | Performed by: NURSE PRACTITIONER

## 2022-09-12 RX ORDER — TRAMADOL HYDROCHLORIDE 50 MG/1
50 TABLET ORAL DAILY PRN
Qty: 30 TABLET | Refills: 0 | Status: SHIPPED | OUTPATIENT
Start: 2022-09-28 | End: 2022-09-27 | Stop reason: SDUPTHER

## 2022-09-12 ASSESSMENT — ENCOUNTER SYMPTOMS
NAUSEA: 0
SHORTNESS OF BREATH: 0
GASTROINTESTINAL NEGATIVE: 1
BACK PAIN: 1
DIARRHEA: 0
CONSTIPATION: 0
EYES NEGATIVE: 1
COUGH: 0

## 2022-09-14 ENCOUNTER — TELEPHONE (OUTPATIENT)
Dept: PAIN MANAGEMENT | Age: 42
End: 2022-09-14

## 2022-09-14 NOTE — TELEPHONE ENCOUNTER
ORDER PLACED:    Date: 9/12/22  Description: RIGHT SI JOINT INJ  Order Number: [id number]  Ordering Provider: Lillian Herrera  Performing Provider: LYNNE  CPT Codes: 38540  ICD10 Codes: M46.1

## 2022-09-14 NOTE — TELEPHONE ENCOUNTER
AUTHORIZATION:    INSURANCE: Agustin Grimaldo [facility] VIAMichelledanica Santos #: [id number]    DATE RANGE: 9/26/2022-10/07/2022    TELEPHONE CALL ROUTED TO MA TO SCHEDULE.

## 2022-09-14 NOTE — TELEPHONE ENCOUNTER
RIGHT SI JOINT INJ    AUTH FROM 9/26/2022-10/07/2022    OK to schedule procedure approved as above. Please note sides/levels approved and date range.    (If applicable, sides/levels approved may differ from those ordered)    TO BE SCHEDULED WITH DR. Stacia Alexander

## 2022-09-19 ENCOUNTER — HOSPITAL ENCOUNTER (OUTPATIENT)
Dept: GENERAL RADIOLOGY | Age: 42
Discharge: HOME | End: 2022-09-21
Payer: COMMERCIAL

## 2022-09-19 DIAGNOSIS — M25.511 ACUTE PAIN OF RIGHT SHOULDER: ICD-10-CM

## 2022-09-19 PROCEDURE — 73030 X-RAY EXAM OF SHOULDER: CPT

## 2022-09-22 ENCOUNTER — TELEPHONE (OUTPATIENT)
Dept: PAIN MANAGEMENT | Age: 42
End: 2022-09-22

## 2022-09-22 NOTE — TELEPHONE ENCOUNTER
BENEFITS: RIGHT SI JOINT INJ      Insurance: [employer]  Phone: [phone]  Contact Name: Linsey Lockwood  Effective Date: 3.1.2022     Plan year: YES-CALENDAR  Deductible: 500.00      Deductible Met: 248.77  Allowed/benefits paid at: 100% AFTE DEDUCTIBLE  OOP: 3000.00 MET $569.91  Freq Limits: 27096--BASED ON MEDICAL NECESSITY  Prior Auth Requirement: NO AUTH REQUIRED    Notes: NO PRE-EX CLAUSE    Call Reference #: [id number]    Time of call: 8:20AM

## 2022-09-27 ENCOUNTER — PROCEDURE VISIT (OUTPATIENT)
Dept: PAIN MANAGEMENT | Age: 42
End: 2022-09-27
Payer: COMMERCIAL

## 2022-09-27 DIAGNOSIS — M46.1 SACROILIITIS, NOT ELSEWHERE CLASSIFIED (HCC): ICD-10-CM

## 2022-09-27 DIAGNOSIS — G89.4 CHRONIC PAIN SYNDROME: ICD-10-CM

## 2022-09-27 DIAGNOSIS — M47.817 LUMBOSACRAL SPONDYLOSIS WITHOUT MYELOPATHY: ICD-10-CM

## 2022-09-27 DIAGNOSIS — Z79.891 ENCOUNTER FOR LONG-TERM OPIATE ANALGESIC USE: ICD-10-CM

## 2022-09-27 DIAGNOSIS — M54.16 LUMBAR RADICULOPATHY: ICD-10-CM

## 2022-09-27 PROCEDURE — 27096 INJECT SACROILIAC JOINT: CPT | Performed by: PAIN MEDICINE

## 2022-09-27 RX ORDER — LIDOCAINE HYDROCHLORIDE 10 MG/ML
10 INJECTION, SOLUTION EPIDURAL; INFILTRATION; INTRACAUDAL; PERINEURAL ONCE
Status: COMPLETED | OUTPATIENT
Start: 2022-09-27 | End: 2022-09-27

## 2022-09-27 RX ORDER — BETAMETHASONE SODIUM PHOSPHATE AND BETAMETHASONE ACETATE 3; 3 MG/ML; MG/ML
6 INJECTION, SUSPENSION INTRA-ARTICULAR; INTRALESIONAL; INTRAMUSCULAR; SOFT TISSUE ONCE
Status: COMPLETED | OUTPATIENT
Start: 2022-09-27 | End: 2022-09-27

## 2022-09-27 RX ORDER — TRAMADOL HYDROCHLORIDE 50 MG/1
50 TABLET ORAL DAILY PRN
Qty: 30 TABLET | Refills: 0 | Status: SHIPPED | OUTPATIENT
Start: 2022-09-28 | End: 2022-10-24 | Stop reason: SDUPTHER

## 2022-09-27 RX ADMIN — BETAMETHASONE SODIUM PHOSPHATE AND BETAMETHASONE ACETATE 6 MG: 3; 3 INJECTION, SUSPENSION INTRA-ARTICULAR; INTRALESIONAL; INTRAMUSCULAR; SOFT TISSUE at 16:16

## 2022-09-27 RX ADMIN — LIDOCAINE HYDROCHLORIDE 10 MG: 10 INJECTION, SOLUTION EPIDURAL; INFILTRATION; INTRACAUDAL; PERINEURAL at 16:15

## 2022-09-27 NOTE — PROGRESS NOTES
[facility]  Neurosurgery and Pain [facility]  [address] [place] [address]., [address] [place] [address] 82: [phone]  F: [phone]      Patient Name: Marjan Bill  : 1980     Date:  2022      Physician: DO Marjan Sow is here today for interventional pain management. Preoperatively, the patient presents with SI joint mediated pain, as per history and exam. Patient has failed NSAIDs, PT, and conservative treatment. Patient has significant psychological and functional impairment due to this condition. Standard ASI guidelines were followed and sterile technique used. Area was cleaned with Betadine x3. Informed consent was obtained. Fluoroscopic guidance was used for this procedure. S.I. JOINT:  Right  Appropriate length spinal needle was advanced to the S.I. Joint. Negative aspiration was achieved. In total, approximately 6mg of Betamethasone and 2ml of 1% preservative free Lidocaine was injected without difficulty. Patient tolerated the procedure well, no obvious complications occurred during the procedure. Patient was appropriately monitored and discharged home in stable condition with their usual motor strength. Post Op Instructions were given to patient. Relevant and recent imaging reviewed with patient today. Estefanía Okeefe, Sherine [facility]  Neurosurgery and [facility] [address] [place] [address] 82: [phone]  F: [phone]      Patient Name: Marjan Bill  : 1980     Date:  2022      Physician: DO Marjan Sow is here today for interventional pain management. Preoperatively, the patient presents with SI joint mediated pain, as per history and exam. Patient has failed NSAIDs, PT, and conservative treatment.  Patient has significant psychological and functional impairment due to this condition. Standard ASIPP guidelines were followed and sterile technique used. Area was cleaned with Betadine x3. Informed consent was obtained. Fluoroscopic guidance was used for this procedure. S.I. JOINT:  Right  Appropriate length spinal needle was advanced to the S.I. Joint. Negative aspiration was achieved. In total, approximately 6mg of Betamethasone and 2ml of 1% preservative free Lidocaine was injected without difficulty. Patient tolerated the procedure well, no obvious complications occurred during the procedure. Patient was appropriately monitored and discharged home in stable condition with their usual motor strength. Post Op Instructions were given to patient. Relevant and recent imaging reviewed with patient today.                                       Gavino Esparza DO

## 2022-10-04 ENCOUNTER — HOSPITAL ENCOUNTER (OUTPATIENT)
Dept: PHYSICAL THERAPY | Age: 42
Setting detail: THERAPIES SERIES
Discharge: HOME | End: 2022-10-04
Payer: COMMERCIAL

## 2022-10-04 PROCEDURE — 97161 PT EVAL LOW COMPLEX 20 MIN: CPT

## 2022-10-04 ASSESSMENT — PAIN DESCRIPTION - FREQUENCY: FREQUENCY: CONTINUOUS

## 2022-10-04 ASSESSMENT — PAIN DESCRIPTION - LOCATION: LOCATION: SHOULDER

## 2022-10-04 ASSESSMENT — PAIN DESCRIPTION - DESCRIPTORS: DESCRIPTORS: SORE

## 2022-10-04 ASSESSMENT — PAIN DESCRIPTION - ONSET: ONSET: SUDDEN

## 2022-10-04 ASSESSMENT — PAIN SCALES - GENERAL: PAINLEVEL_OUTOF10: 7

## 2022-10-04 ASSESSMENT — PAIN DESCRIPTION - ORIENTATION: ORIENTATION: RIGHT;ANTERIOR

## 2022-10-04 ASSESSMENT — PAIN - FUNCTIONAL ASSESSMENT: PAIN_FUNCTIONAL_ASSESSMENT: PREVENTS OR INTERFERES WITH ALL ACTIVE AND SOME PASSIVE ACTIVITIES

## 2022-10-04 ASSESSMENT — PAIN DESCRIPTION - PAIN TYPE: TYPE: CHRONIC PAIN

## 2022-10-04 NOTE — PROGRESS NOTES
[facility]  PHYSICAL THERAPY EVALUATION      Physical Therapy: Initial Evaluation    Patient: Kady Robertson (81 y.o.     male)   Examination Date: 10/04/2022   :  1980 ;    Confirmed: Yes MRN: [id number]  CSN: [id number]   Insurance: Payor: Willie Yepez / Plan: BCBS - [place] PPO / Product Type: *No Product type* /   Insurance ID: [id number] - ([facility]) Secondary Insurance (if applicable):    Referring Physician: LOW Vinson CNP       Visits to Date/Visits Approved:     No Show/Cancelled Appts: 0 / 0     Medical Diagnosis: Acute pain of right shoulder [M25.511]        Treatment Diagnosis: R shoulder pain, decreased R shoulder IR AROM, decreased R UE and periscpaular strength, decreased postural awareness, tenderness at bicep insertion, (+) R Calixto-Bernard, Neers, empty can, and speeds special tests     PERTINENT MEDICAL HISTORY   Patient Assessed for Rehabilitation Services: Yes       Medical History: Chart Reviewed: Yes No past medical history on file. Surgical History: No past surgical history on file. Medications:   Current Outpatient Medications:     traMADol (ULTRAM) 50 MG tablet, Take 1 tablet by mouth daily as needed for Pain for up to 30 days. , Disp: 30 tablet, Rfl: 0    testosterone enanthate (DELATESTRYL) 200 MG/ML injection, Inject 1 mL into the muscle once for 1 dose. 1 ml every 2weeks, Disp: 10 mL, Rfl: 0  Allergies: Patient has no known allergies. Imaging (if applicable): XR SHOULDER RIGHT (MIN 2 VIEWS)    Result Date: 2022  EXAMINATION: THREE XRAY VIEWS OF THE RIGHT SHOULDER 2022 6:48 pm COMPARISON: None. HISTORY: ORDERING SYSTEM PROVIDED HISTORY: Acute pain of right shoulder TECHNOLOGIST PROVIDED HISTORY: Reason for exam:->Anterior Rt shoulder pain FINDINGS: Three views of the right shoulder reveal no evidence of acute fracture or dislocation. The cortex is intact. Joint spaces are preserved.   No soft tissue abnormality identified. No acute bony abnormality peer     SUBJECTIVE EXAMINATION     History obtained from[de-identified] Chart Review, Patient,      Family/Caregiver Present: No    Subjective History: Onset Date:  (June 2022)  Subjective: Pt reports having onset of anteior R shoulder pain while pushing a car up an inlcine while at work. Pt reports he felt a pinch/strain/pull in shoulder joint. Pt reports pain was sore the following days and has not improved since. Pt reports he has a hx of L labral tear and it feels the same as L shoulder did. Pt denies having any N/T into R E. Additional Pertinent Hx (if applicable):     Prior diagnostic testing[de-identified] X-ray  Any changes to allergies, medications, or have you had any medical procedures/ER visits since your last visit?: No      Learning/Language: Learning  Will there be a co-learner?: No  What is the preferred language of the patient/guardian?: English  Is an  required?: No     Pain Screening    Pain Screening  Patient Currently in Pain: Yes  Pain Assessment: 0-10  Pain Level: 7  Best Pain Level: 4  Worst Pain Level: 8  Pain Type: Chronic pain  Pain Location: Shoulder  Pain Orientation: Right, Anterior  Pain Descriptors: Sore  Pain Frequency: Continuous  Pain Onset: Sudden  Functional Pain Assessment: Prevents or interferes with all active and some passive activities  Aggravating factors:  Movement, Reaching, Lifting, Carrying  Pain Management/Relieving Factors: Rest, Medications    Functional Status        Occupation/Interests:   Occupation: Full time employment  Type of Occupation:  at [address] [employer]: 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    Prior Level of Function:   100% Independent     Current Level of Function:   75%      ADL Assistance: [address]: Independent  Homemaking Responsibilities: Yes  Ambulation Assistance: Independent  Transfer Assistance: Independent  Active : Yes    Dominant Hand: : Right    OBJECTIVE EXAMINATION     Review of Systems:  Follows Commands: Within Functional Limits    Observations:   General Observations  Description: mild rounded shoudlers, increased thoracic kyphosis, FHP    Left AROM  Right AROM         AROM LUE (degrees)  L Shoulder Flexion 0-180: 150  L Shoulder ABduction 0-180: 145  L Shoulder Int Rotation  0-70: T10  L Shoulder Ext Rotation  0-90: T3    AROM RUE (degrees)  R Shoulder Flexion 0-180: 155  R Shoulder ABduction 0-180: 150  R Shoulder Int Rotation  0-70: L1 with paina nd tightness  R Shoulder Ext Rotation 0-90: T3      Left Strength  Right Strength         Strength LUE  L Shoulder Flexion: 5/5  L Shoulder ABduction: 5/5  L Shoulder Internal Rotation: 5/5  L Shoulder External Rotation: 5/5  L Elbow Flexion: 5/5  L Elbow Extension: 5/5  L Middle Trapezius: 4+/5  L Rhomboids: 4/5  L Lower Trapezius: 4+/5    Strength RUE  R Shoulder Flexion: 4+/5  R Shoulder ABduction: 4+/5  R Shoulder Internal Rotation: 5/5  R Shoulder External Rotation: 4+/5  R Elbow Flexion: 5/5  R Elbow Extension: 5/5  R Middle Trapezius: 4-/5  R Rhomboids: 4-/5  R Lower Trapezius: 3+/5     Thoracic Assessment     AROM Thoracic Spine   Thoracic spine general AROM: B rotation 75%        Special Tests:   Impingement Tests  Neer Test: R (+)  Felix Bernard Test: R (+)  Lift-Off Test: R (+)  Drop Arm: R (-)  Empty Can: R (+)  Speeds Test: R (+)  Labral Tests  Clunk Test (Labrum): R (-)  Crank Test (Labrum): R (-)  Pronated Load Test (SLAP): R (-)    Outcomes Score:  Exam: UEFI 62/80    Treatment:    Exercises:   Exercises  Exercise 1: UBE*  Exercise 2: wall sldies for pain free ROM*  Exercise 3: sleeper stretch*  Exercise 4: corner stretch*  Exercise 5: lookaway stretch*  Exercise 6: prone scap*  Exercise 7: scap retract*  Exercise 8: S/L ER and abd*  Exercise 20: PROM: R shoulder IR*     Modalities:  Modalities  Modalities:  (U/S, IFC, CP, MHP PRN*)     Manual:  Manual Therapy  Soft Tissue Mobilizaton: R bicep, pec*     *Indicates exercise,modality, or manual techniques to be initiated when appropriate    ASSESSMENT     Impression: Assessment: Pt presents with onset of R anterior shoulder pain beginning in June 2022 after pushing a car up an incline while at work. He currently presents with decreased R shoulder IR AROM, decreased R UE and periscpaular strength, decreased postural awareness, tenderness at bicep insertion, (+) R Calixto-Bernard, Neers, empty can, and speeds special tests indicating possible RTC impingement with biceps tendinopathy. These impairments currenlty limit his fucntional abilities to reach, lift, carry, push objects, or perform overhead activities without increased pain or limitations at PLOF. Body Structures, Functions, Activity Limitations Requiring Skilled Therapeutic Intervention: Decreased ADL status, Decreased ROM, Decreased strength, Increased pain, Decreased posture, Decreased high-level IADLs    Statement of Medical Necessity: Physical Therapy is both indicated and medically necessary as outlined in the POC to increase the likelihood of meeting the functionally related goals stated below.      Patient's Activity Tolerance: Patient tolerated evaluation without incident      Patient's rehabilitation potential/prognosis is considered to be: Good    Factors which may impact rehabilitation potential include: Chronicity of problem, Medical co-morbidities, Pain tolerance/management     Patient Education: PT Education: Goals, PT Role, Plan of Care, Evaluative findings, Insurance     GOALS     Short Term Goals Completed by 2-3 weeks Goal Status   STG 1The pt will demonstrate improved postural awareness requiring <25% VC's with exercises New   STG 2 The pt will have decreased R shoulder pain </=2 /10 at worst in order to increase ease with ADL's New     Long Term Goals Completed by 4-6 weeks Goal Status   LTG 1 The pt will be indep/compliant with HEP in order to self manage symptoms upon D/C New   LTG 2 The pt will have an increase in UEFI score >/=10 points in order to increase functional activity tolerance New   LTG 3 The pt will demo improved R shoulder IR AROM WNL's  in order to increase ease with ADL's New   LTG 4 The pt will demonstrate improved R UE strength 5/5 except periscpaulars >/=4 to 4+/5 in order to lift/carry and perofrm overhead activity with decreased pain New     TREATMENT PLAN       Requires PT Follow-Up: Yes  Specific Instructions for Next Treatment: Provide HEP    Treatment may include any combination of the following: Strengthening, ROM, Neuromuscular re-education, Manual Therapy - Soft Tissue Mobilization, Manual Therapy - Joint Manipulation, Pain management, Equipment evaluation, education, & procurement, Modalities, Positioning, Patient/Caregiver education & training, Home exercise program, Integrated dry needling     Frequency / Duration:  Patient to be seen 2 times per week for 4-6 weeks      Eval Complexity:   Decision Making: Low Complexity  History: Personal Factors and/or Comorbidities Impacting POC: Medium  Examination of body system(s) including body structures and functions, activity limitations, and/or participation restrictions: High  Exam: UEFI 62/80  Clinical Presentation: Low  Clinical Decision Making : Low Complexity    POST-PAIN     Pain Rating (0-10 pain scale):   sane/10  Location and pain description same as pre-treatment unless indicated. Action: [] NA  [] Call Physician  [] Perform HEP  [x] Meds as prescribed    Evaluation and patient rights have been reviewed and patient agrees with plan of care.   Yes  [x]  No  []   Explain:     Jethro Fall Risk Assessment  Risk Factor Scale  Score   History of Falls [] Yes  [x] No 25  0    Secondary Diagnosis [] Yes  [x] No 15  0    Ambulatory Aid [] Furniture  [] Crutches/cane/walker  [x] None/bedrest/wheelchair/nurse 30  15  0    IV/Heparin Lock [] Yes  [x] No 20  0 Gait/Transferring [] Impaired  [] Weak  [x] Normal/bedrest/immobile 20  10  0    Mental Status [] Forgets limitations  [x] Oriented to own ability 15  0       Total: 0     Based on the Assessment score: check the appropriate box.   [x]  No intervention needed   Low =   Score of 0-24  []  Use standard prevention interventions Moderate =  Score of 24-44   [] Discuss fall prevention strategies   [] Indicate moderate falls risk on eval  []  Use high risk prevention interventions High = Score of 45 and higher   [] Discuss fall prevention strategies   [] Provide supervision during treatment time    Minutes:  PT Individual Minutes  Time In: 1620  Time Out: 1640  Minutes: 20  Timed Code Treatment Minutes: 0 Minutes  Procedure Minutes: 20 eval      Electronically signed by Abhi Elam PT on 10/4/22 at 4:59 PM EDT

## 2022-10-04 NOTE — PROGRESS NOTES
[address]  PHYSICAL THERAPY PLAN OF CARE   [address] [place], [address] [facility]         Ph: [phone]  Fax: [phone]    [] Certification  [] Recertification [x]  Plan of Care  [] Progress Note [] Discharge      Referring Provider: LOW Angel - CNP     From:  Neeraj Rico, PT, DPT  Patient: Shea Devi (51 y.o. male) : 1980 Date: 10/4/2022  Medical Diagnosis: Acute pain of right shoulder [M25.511]       Treatment Diagnosis: R shoulder pain, decreased R shoulder IR AROM, decreased R UE and periscpaular strength, decreased postural awareness, tenderness at bicep insertion, (+) R Calixto-Bernard, Neers, empty can, and speeds special tests    Progress Report Period from:  10/4/2022  to 10/4/2022    Visits to Date: 1 No Show: 0 Cancelled Appts: 0    OBJECTIVE:   Short Term Goals - Time Frame for Short Term Goals: 2-3 weeks    Goals Current/Discharge status  Status   Short Term Goal 1: The pt will demonstrate improved postural awareness requiring <25% VC's with exercises  fair New   Short Term Goal 2: The pt will have decreased R shoulder pain </=2 /10 at worst in order to increase ease with ADL's  Pain Screening  Patient Currently in Pain: Yes  Pain Assessment: 0-10  Pain Level: 7  Best Pain Level: 4  Worst Pain Level: 8  Pain Type: Chronic pain  Pain Location: Shoulder  Pain Orientation: Right, Anterior  Pain Descriptors: Sore  Pain Frequency: Continuous  Pain Onset: Sudden  Functional Pain Assessment: Prevents or interferes with all active and some passive activities  Aggravating factors:  Movement, Reaching, Lifting, Carrying  Pain Management/Relieving Factors: Rest, Medications New     Long Term Goals - Time Frame for Long Term Goals : 4-6 weeks  Goals Current/ Discharge status Status   Long Term Goal 1: The pt will be indep/compliant with HEP in order to self manage symptoms upon D/C ongoing New   Long Term Goal 2: The pt will have an increase in UEFI score >/=10 points in order to increase functional activity tolerance Exam: UEFI 62/80   New   Long Term Goal 3: The pt will demo improved R shoulder IR AROM WNL's  in order to increase ease with ADL's  AROM RUE (degrees)  R Shoulder Int Rotation  0-70: L1 with pain and tightness New   Long Term Goal 4: The pt will demonstrate improved R UE strength 5/5 except periscpaulars >/=4 to 4+/5 in order to lift/carry and perofrm overhead activity with decreased pain   Strength RUE  R Shoulder Flexion: 4+/5  R Shoulder ABduction: 4+/5  R Shoulder Internal Rotation: 5/5  R Shoulder External Rotation: 4+/5  R Elbow Flexion: 5/5  R Elbow Extension: 5/5  R Middle Trapezius: 4-/5  R Rhomboids: 4-/5  R Lower Trapezius: 3+/5  New     Body Structures, Functions, Activity Limitations Requiring Skilled Therapeutic Intervention: Decreased ADL status, Decreased ROM, Decreased strength, Increased pain, Decreased posture, Decreased high-level IADLs  Assessment: Pt presents with onset of R anterior shoulder pain beginning in June 2022 after pushing a car up an incline while at work. He currently presents with decreased R shoulder IR AROM, decreased R UE and periscpaular strength, decreased postural awareness, tenderness at bicep insertion, (+) R Calixto-Bernard, Neers, empty can, and speeds special tests indicating possible RTC impingement with biceps tendinopathy. These impairments currenlty limit his fucntional abilities to reach, lift, carry, push objects, or perform overhead activities without increased pain or limitations at PLOF. Specific Instructions for Next Treatment: Provide HEP  Therapy Prognosis: Good    PT Education: Goals;PT Role;Plan of Care;Evaluative findings; Insurance    PLAN: [x] Evaluate and Treat  Frequency/Duration:  Plan Frequency: 2  Plan weeks: 4-6  Specific Instructions for Next Treatment: Provide HEP  Current Treatment Recommendations: Strengthening, ROM, Neuromuscular re-education, Manual Therapy - Soft Tissue Mobilization, Manual Therapy - Joint Manipulation, Pain management, Equipment evaluation, education, & procurement, Modalities, Positioning, Patient/Caregiver education & training, Home exercise program, Integrated dry needling                     Patient Status:[x] Continue/ Initiate plan of Care    [] Discharge PT. Recommend pt continue with HEP. [] Additional visits requested, Please re-certify for additional visits:    [] Hold         Signature: Electronically signed by Dario Gifford PT on 10/4/22 at 4:55 PM EDT    If you have any questions or concerns, please don't hesitate to call. Thank you for your referral.    I have reviewed this plan of care and certify a need for medically necessary rehabilitation services.     Physician Signature:__________________________________________________________  Date:  Please sign and return

## 2022-10-18 NOTE — PROGRESS NOTES
[address]  PHYSICAL THERAPY PLAN OF CARE   [address] [place], [address] [facility]         Ph: [phone]  Fax: [phone]    [] Certification  [] Recertification []  Plan of Care  [] Progress Note [x] Discharge      Referring Provider: Catalina Mullins, APRN - CNP     From:  Nakia Houston, PT, DPT  Patient: Magdaleno Echavarria (1 y.o. male) : 1980 Date: 10/18/2022  Medical Diagnosis: Acute pain of right shoulder [M25.511]       Treatment Diagnosis: R shoulder pain, decreased R shoulder IR AROM, decreased R UE and periscpaular strength, decreased postural awareness, tenderness at bicep insertion, (+) R Calixto-Bernard, Neers, empty can, and speeds special tests    Progress Report Period from:  10/4/2022  to 10/4/2022    Visits to Date: 1 No Show: 0 Cancelled Appts: 0    OBJECTIVE:   Short Term Goals - Time Frame for Short Term Goals: 2-3 weeks    Goals Current/Discharge status  Status   Short Term Goal 1: The pt will demonstrate improved postural awareness requiring <25% VC's with exercises  NT secondary to unexpected D/C  Unable to assess   Short Term Goal 2: The pt will have decreased R shoulder pain </=2 /10 at worst in order to increase ease with ADL's  NT secondary to unexpected D/C  Unable to assess     Long Term Goals - Time Frame for Long Term Goals : 4-6 weeks  Goals Current/ Discharge status Status   Long Term Goal 1: The pt will be indep/compliant with HEP in order to self manage symptoms upon D/C NT secondary to unexpected D/C  Unable to assess   Long Term Goal 2: The pt will have an increase in UEFI score >/=10 points in order to increase functional activity tolerance NT secondary to unexpected D/C  Unable to assess   Long Term Goal 3: The pt will demo improved R shoulder IR AROM WNL's  in order to increase ease with ADL's NT secondary to unexpected D/C  Unable to assess   Long Term Goal 4: The pt will demonstrate improved R UE strength 5/5 except periscpaulars >/=4 to 4+/5 in order to lift/carry and perofrm overhead activity with decreased pain NT secondary to unexpected D/C  Unable to assess     Body Structures, Functions, Activity Limitations Requiring Skilled Therapeutic Intervention: Decreased ADL status, Decreased ROM, Decreased strength, Increased pain, Decreased posture, Decreased high-level IADLs  Assessment: Unable to determine functional outcomes d/t unexpected D/C after evaluation as pts insurance company denied further PT at an outpatient hospital setting. Pt was called and left voicemail notifying him of denial and further options at this time. Therapy Prognosis: Good    PLAN: D/C      Patient Status:[] Continue/ Initiate plan of Care    [x] Discharge PT. Recommend pt continue with HEP. [] Additional visits requested, Please re-certify for additional visits:    [] Hold         Signature: Electronically signed by Chio Taylor PT on 10/18/22 at 4:26 PM EDT    If you have any questions or concerns, please don't hesitate to call. Thank you for your referral.    I have reviewed this plan of care and certify a need for medically necessary rehabilitation services.     Physician Signature:__________________________________________________________  Date:  Please sign and return

## 2022-10-24 ENCOUNTER — TELEPHONE (OUTPATIENT)
Dept: PAIN MANAGEMENT | Age: 42
End: 2022-10-24

## 2022-10-24 ENCOUNTER — OFFICE VISIT (OUTPATIENT)
Dept: PAIN MANAGEMENT | Age: 42
End: 2022-10-24
Payer: COMMERCIAL

## 2022-10-24 VITALS
WEIGHT: 230 LBS | BODY MASS INDEX: 34.07 KG/M2 | SYSTOLIC BLOOD PRESSURE: 122 MMHG | HEIGHT: 69 IN | TEMPERATURE: 98 F | DIASTOLIC BLOOD PRESSURE: 80 MMHG

## 2022-10-24 DIAGNOSIS — M46.1 SACROILIITIS, NOT ELSEWHERE CLASSIFIED (HCC): ICD-10-CM

## 2022-10-24 DIAGNOSIS — G89.4 CHRONIC PAIN SYNDROME: ICD-10-CM

## 2022-10-24 DIAGNOSIS — M47.817 LUMBOSACRAL SPONDYLOSIS WITHOUT MYELOPATHY: Primary | ICD-10-CM

## 2022-10-24 DIAGNOSIS — M54.16 LUMBAR RADICULOPATHY: ICD-10-CM

## 2022-10-24 DIAGNOSIS — Z79.891 ENCOUNTER FOR LONG-TERM OPIATE ANALGESIC USE: ICD-10-CM

## 2022-10-24 PROCEDURE — 99214 OFFICE O/P EST MOD 30 MIN: CPT | Performed by: NURSE PRACTITIONER

## 2022-10-24 RX ORDER — TRAMADOL HYDROCHLORIDE 50 MG/1
50 TABLET ORAL SEE ADMIN INSTRUCTIONS
Qty: 40 TABLET | Refills: 0 | Status: SHIPPED | OUTPATIENT
Start: 2022-10-24 | End: 2022-11-22 | Stop reason: SDUPTHER

## 2022-10-24 ASSESSMENT — ENCOUNTER SYMPTOMS
DIARRHEA: 0
GASTROINTESTINAL NEGATIVE: 1
BACK PAIN: 1
SHORTNESS OF BREATH: 0
EYES NEGATIVE: 1
NAUSEA: 0
COUGH: 0
CONSTIPATION: 0

## 2022-10-24 NOTE — TELEPHONE ENCOUNTER
PLEASE CONTACT PATIENT AND OBTAIN THE PERCENTAGE OF PAIN RELIEF PATIENT EXPERIENCED FROM THE LAST LUMBAR RFA PREFORMED ON 4/25/22

## 2022-10-24 NOTE — PROGRESS NOTES
Toby Montenegro  (1980)    10/24/2022    Subjective:     Toby Montenegro is 38 y.o. male who complains today of:    Chief Complaint   Patient presents with    Back Pain         Allergies:  Patient has no known allergies. History reviewed. No pertinent past medical history. History reviewed. No pertinent surgical history. History reviewed. No pertinent family history. Social History     Socioeconomic History    Marital status:      Spouse name: Not on file    Number of children: Not on file    Years of education: Not on file    Highest education level: Not on file   Occupational History    Not on file   Tobacco Use    Smoking status: Never    Smokeless tobacco: Never   Substance and Sexual Activity    Alcohol use: Yes     Alcohol/week: 0.0 standard drinks    Drug use: No    Sexual activity: Not on file   Other Topics Concern    Not on file   Social History Narrative    Not on file     Social Determinants of Health     Financial Resource Strain: Low Risk     Difficulty of Paying Living Expenses: Not hard at all   Food Insecurity: No Food Insecurity    Worried About Running Out of Food in the Last Year: Never true    Ran Out of Food in the Last Year: Never true   Transportation Needs: Not on file   Physical Activity: Not on file   Stress: Not on file   Social Connections: Not on file   Intimate Partner Violence: Not on file   Housing Stability: Not on file       Current Outpatient Medications on File Prior to Visit   Medication Sig Dispense Refill    testosterone enanthate (DELATESTRYL) 200 MG/ML injection Inject 1 mL into the muscle once for 1 dose. 1 ml every 2weeks 10 mL 0     No current facility-administered medications on file prior to visit. Pt presents today for a f/u of his pain. PCP is Dr. Irene Schwab, MD. Patient last saw Dr. Marcello Manning on 9/27/2022 for right SI joint injection.   He felt this helped significantly, but didn't last.  Right shoulder x-ray ordered as well as physical therapy past and possibly see Dr. Silvia Montalvo for injection in the future. Discussed he may need to repeat RF ablation at the end of October. X-ray report of right shoulder from 9/19/2022 show no fracture, joint spaces preserved. He says he got medical marijuana card and feels this helps with his anxiety. He thinks he can go down to 1 tramadol a day. Seen Dr. Agus Mcelroy on 4/25/2022 for right L3-4-5 RF ablation. - helped significantly. He is having more Rt anterior shoulder pain - says pushing car a couple months ago and felt pop and next day sore with pinch feeling in front of shoulder. Says pain can be \"sharp\". Pain has not gotten much better. Hx of Lt shoulder rotator cuff repairs in the past.      He says the Rt leg pain hasn't been too bad. He says his knee pain is manageable. He has chronic low back and right leg pain. Right leg pain goes down into the foot at times. He works full time. He has left lami with Dr Pruett Alu years ago. Rt L5-S1 cornelius denied by his insurance - needs MRI/PT. Hx Rt L5-S1 cornelius, SI joint injections and Rt L2,3,4,5 RF ablation in the past all with relief and to allow him to be more active. Uses a right knee brace at times. He is vaccinated against Covid. He has not been using his Lyrica 75mg. He continues tramadol 50mg 1-2 tabs daily PRN    Pt feels pain level with his medication is better, and 7/10 without medication. Pt feels that weather change, pushing on Rt arm, activity makes the pain worse, and medication to some degree, stretching makes the pain better. Pt feels his medication helps   him function and improve his quality of life, specifically says allows to work and be active. Pt admits to Rt leg pain, but no radiating numbness and tingling. Denies recent falls, injuries or trauma. Pt denies new weakness. Pt reports PT has been done. Review of Systems   Constitutional:  Negative for fever. HENT: Negative. Eyes: Negative.     Respiratory:  Negative for cough and shortness of breath. Cardiovascular:  Negative for chest pain. Gastrointestinal: Negative. Negative for constipation, diarrhea and nausea. Endocrine: Negative. Genitourinary: Negative. Musculoskeletal:  Positive for arthralgias and back pain. Skin: Negative. Neurological:  Negative for dizziness and weakness. Psychiatric/Behavioral: Negative. Objective:     Vitals:  /80 (Site: Left Upper Arm, Position: Sitting)   Temp 98 °F (36.7 °C)   Ht 5' 9\" (1.753 m)   Wt 230 lb (104.3 kg)   BMI 33.97 kg/m² Pain Score:   7      Physical Exam  Vitals and nursing note reviewed. This is a pleasant male who answers questions appropriately and follows commands. Pt is alert and oriented x 3. Recent and remote memory is intact. Mood and affect, judgement and insight are normal.  No signs of distress, no dyspnea or SOB noted. HEENT: PERRL. Neck is supple, trachea midline. No lymphadenopathy noted. Decreased ROM with flexion and extension of low back at extremes. Tender with palpation to lumbar spine positive provocative maneuvers. Not too tender over SI joint today. Tightness appreciated over left lower lumbar paraspinal muscles. Tattoos noted. Negative SLR. Tightness in both hamstrings noted. Rt knee without pain with palpation. Crepitus with ROM of Lt knee. Full range of motion. Able to ambulate without difficulty. Balance and coordination normal.  Strength functional for ambulation. Rt shoulder with decreased ROM and strength noted. Tenderness with palpation to anterior Rt shoulder continues. Cranial nerves II-XII are intact. Assessment:      Diagnosis Orders   1. Lumbosacral spondylosis without myelopathy  traMADol (ULTRAM) 50 MG tablet    MA RADIOFREQUENCY NEUROTOMY LUMBAR OR SACRAL, W IMAGE GUIDANCE, SINGLE    MA RADIOFREQ NEUROTOMY LUMBAR OR SACRAL, W IMAGE GUIDE,EA ADDL LEVEL    CHG FLUOR NEEDLE/CATH SPINE/PARASPINAL DX/THER ADDON      2.  Sacroiliitis, not elsewhere classified ([facility] [address]  traMADol (ULTRAM) 50 MG tablet      3. Lumbar radiculopathy  traMADol (ULTRAM) 50 MG tablet      4. Chronic pain syndrome  traMADol (ULTRAM) 50 MG tablet      5. Encounter for long-term opiate analgesic use  traMADol (ULTRAM) 50 MG tablet          Plan:     Periodic Controlled Substance Monitoring: Possible medication side effects, risk of tolerance/dependence & alternative treatments discussed., No signs of potential drug abuse or diversion identified. , Assessed functional status., Obtaining appropriate analgesic effect of treatment. (Carlee Rendon, APRN - CNP)    Orders Placed This Encounter   Medications    traMADol (ULTRAM) 50 MG tablet     Sig: Take 1 tablet by mouth See Admin Instructions for 30 days. Take 1-2 tabs po PRN pain (#40 tabs for 30 days) ok to fill today     Dispense:  40 tablet     Refill:  0     Reduce doses taken as pain becomes manageable       Orders Placed This Encounter   Procedures    CHG FLUOR NEEDLE/CATH SPINE/PARASPINAL DX/THER ADDON     Standing Status:   Future     Standing Expiration Date:   1/22/2023    TX RADIOFREQUENCY NEUROTOMY LUMBAR OR SACRAL, W IMAGE GUIDANCE, SINGLE     Rt L(2)3,4,5 RFA with SK     Standing Status:   Future     Standing Expiration Date:   1/22/2023    TX RADIOFREQ NEUROTOMY LUMBAR OR SACRAL, W IMAGE GUIDE,EA ADDL LEVEL     Standing Status:   Future     Standing Expiration Date:   1/22/2023     Discussed options with the patient today. Anatomic model pathology was shown and reviewed with pt. The SI joint injection helped, but didn't last. He is tender over lumbar spine today. We will go ahead and order  right L2, L3, L4, L5 RF ablation with Dr. Cecilia Milligan as pt has had >80% pain relief with diagnostic MBB's and improvement with past RF ablations. he has failed conservative treatment in the past. Anatomic model of pathology was shown. Risks and benefits of the procedure were discussed.  All questions were answered and patient understands and agrees with the plan. All questions were answered. Discussed home exercise program.  Relevant imaging and pain generators reviewed. Pt verbalized understanding and agrees with above plan. Pt has chronic pain. He is having more pain and will temporarily increase his tramadol to 1-2 daily PRN pain (#40 tabs for 30 days). Hope to reduce after RF ablation as discussed. He will start PT for Rt shoulder as discussed - he says insurance is making him [facility]. May need to see Dr. Alexi Sierra as discussed. Reviewed Rt shoulder XR in detail with pt. Utox reviewed from June 2022 - appropriate for tramadol but also low levels of THC - has medical marijuana. ORT score 1 - low risk. Narcan Rx declined       Will continue medications for chronic pain that has been previously directed as they do help pt function with ADL and improve quality of life. Pt is aware goal is to use the least amount of medication possible to allow pt to function and help with quality of life as discussed in detail. Ideal to keep MME below 50 which it is. Have discussed proper disposal of narcotic medication. Advised to have medications in safe place and locked up/in lock box. Discussed possible risks of opiate medication with pt, including, but not limited to possible constipation, nausea or vomiting, sedation, urinary retention, dependence and possible addiction. Pt agrees to use medication as prescribed/as directed. Pt advised to not use opiates while driving or operating heavy equipment, or in situations where pt may harm him/herself or others. Pt is aware that while on narcotics, pt needs to be seen to reassess pain and reassess need for continued medication at that time. NDP reviewed. OARRS was reviewed. This NP saw pt under direct supervision of Dr. Gaudencio Dorado. Follow up:  Return in about 4 weeks (around 11/21/2022) for f/u after procedure and reassess pain/medications.     Keith Land, LOW - CNP

## 2022-10-26 ENCOUNTER — TELEPHONE (OUTPATIENT)
Dept: PAIN MANAGEMENT | Age: 42
End: 2022-10-26

## 2022-11-22 ENCOUNTER — OFFICE VISIT (OUTPATIENT)
Dept: PAIN MANAGEMENT | Age: 42
End: 2022-11-22
Payer: COMMERCIAL

## 2022-11-22 VITALS
SYSTOLIC BLOOD PRESSURE: 132 MMHG | HEIGHT: 69 IN | TEMPERATURE: 97.3 F | BODY MASS INDEX: 32.58 KG/M2 | WEIGHT: 220 LBS | DIASTOLIC BLOOD PRESSURE: 76 MMHG

## 2022-11-22 DIAGNOSIS — M46.1 SACROILIITIS, NOT ELSEWHERE CLASSIFIED (HCC): ICD-10-CM

## 2022-11-22 DIAGNOSIS — M47.817 LUMBOSACRAL SPONDYLOSIS WITHOUT MYELOPATHY: ICD-10-CM

## 2022-11-22 DIAGNOSIS — G89.4 CHRONIC PAIN SYNDROME: ICD-10-CM

## 2022-11-22 DIAGNOSIS — Z79.891 ENCOUNTER FOR LONG-TERM OPIATE ANALGESIC USE: ICD-10-CM

## 2022-11-22 DIAGNOSIS — M54.16 LUMBAR RADICULOPATHY: ICD-10-CM

## 2022-11-22 PROCEDURE — 99214 OFFICE O/P EST MOD 30 MIN: CPT | Performed by: NURSE PRACTITIONER

## 2022-11-22 RX ORDER — TRAMADOL HYDROCHLORIDE 50 MG/1
50 TABLET ORAL SEE ADMIN INSTRUCTIONS
Qty: 40 TABLET | Refills: 1 | Status: SHIPPED | OUTPATIENT
Start: 2022-11-23 | End: 2023-01-22

## 2022-11-22 ASSESSMENT — ENCOUNTER SYMPTOMS
COUGH: 0
EYES NEGATIVE: 1
DIARRHEA: 0
SHORTNESS OF BREATH: 0
CONSTIPATION: 0
BACK PAIN: 1
NAUSEA: 0
GASTROINTESTINAL NEGATIVE: 1

## 2022-11-22 NOTE — PROGRESS NOTES
Magdaleno Echavarria  (1980)    11/22/2022    Subjective:     Magdaleno Echavarria is 38 y.o. male who complains today of:    Chief Complaint   Patient presents with    Back Pain         Allergies:  Patient has no known allergies. History reviewed. No pertinent past medical history. History reviewed. No pertinent surgical history. History reviewed. No pertinent family history. Social History     Socioeconomic History    Marital status:      Spouse name: Not on file    Number of children: Not on file    Years of education: Not on file    Highest education level: Not on file   Occupational History    Not on file   Tobacco Use    Smoking status: Never    Smokeless tobacco: Never   Substance and Sexual Activity    Alcohol use: Yes     Alcohol/week: 0.0 standard drinks    Drug use: No    Sexual activity: Not on file   Other Topics Concern    Not on file   Social History Narrative    Not on file     Social Determinants of Health     Financial Resource Strain: Low Risk     Difficulty of Paying Living Expenses: Not hard at all   Food Insecurity: No Food Insecurity    Worried About Running Out of Food in the Last Year: Never true    Ran Out of Food in the Last Year: Never true   Transportation Needs: Not on file   Physical Activity: Not on file   Stress: Not on file   Social Connections: Not on file   Intimate Partner Violence: Not on file   Housing Stability: Not on file       Current Outpatient Medications on File Prior to Visit   Medication Sig Dispense Refill    testosterone enanthate (DELATESTRYL) 200 MG/ML injection Inject 1 mL into the muscle once for 1 dose. 1 ml every 2weeks 10 mL 0     No current facility-administered medications on file prior to visit. Pt presents today for a f/u of his pain. PCP is Dr. Abbi Us MD. Patient last saw this NP at which time Rt L2,3,4,5 RF ablation ordered. On 9/27/2022 for right SI joint injection.   He felt this helped significantly, but didn't last.  Right shoulder x-ray ordered as well as physical therapy past and possibly see Dr. Link Diaz for injection in the future. X-ray report of right shoulder from 9/19/2022 show no fracture, joint spaces preserved. He says he got medical marijuana card and feels this helps with his anxiety. He thinks he can go down to 1 tramadol a day. Seen Dr. Kanchan Wadsworth on 4/25/2022 for right L3-4-5 RF ablation. - helped significantly and says helped >80%. He has done PT in the past.      He is having more Rt anterior shoulder pain - says pushing car a couple months ago and felt pop and next day sore with pinch feeling in front of shoulder. Says pain can be \"sharp\". Pain has not gotten much better. Hx of Lt shoulder rotator cuff repairs in the past.      He says the Rt leg pain hasn't been too bad. He says his knee pain is manageable. He has chronic low back and right leg pain. Right leg pain goes down into the foot at times. He works full time. He has left lami with Dr Moisés Dougherty years ago. Rt L5-S1 cornelius denied by his insurance - needs MRI/PT. Hx Rt L5-S1 cornelius, SI joint injections and Rt L2,3,4,5 RF ablation in the past all with relief and to allow him to be more active. Uses a right knee brace at times. He is vaccinated against Covid. He has not been using his Lyrica 75mg. He continues tramadol 50mg 1-2 tabs daily PRN. Pt feels pain level with his medication is 4/10, and 6+/10 without medication. Pt feels that weather change, too much activity makes the pain worse, and RF ablation, medication makes the pain better. Pt feels his medication helps   him function and improve his quality of life, specifically says allows to more and do ADL's. Pt denies radiating numbness and tingling. Denies recent falls, injuries or trauma. Pt denies new weakness. Pt reports PT has been done in the past.         Review of Systems   Constitutional:  Negative for fever. HENT: Negative. Eyes: Negative.     Respiratory:  Negative for cough and shortness of breath. Cardiovascular:  Negative for chest pain. Gastrointestinal: Negative. Negative for constipation, diarrhea and nausea. Endocrine: Negative. Genitourinary: Negative. Musculoskeletal:  Positive for arthralgias and back pain. Skin: Negative. Neurological:  Negative for dizziness and weakness. Psychiatric/Behavioral: Negative. Objective:     Vitals:  /76   Temp 97.3 °F (36.3 °C)   Ht 5' 9\" (1.753 m)   Wt 220 lb (99.8 kg)   BMI 32.49 kg/m² Pain Score:   6      Physical Exam  Vitals and nursing note reviewed. This is a pleasant male who answers questions appropriately and follows commands. Pt is alert and oriented x 3. Recent and remote memory is intact. Mood and affect, judgement and insight are normal.  No signs of distress, no dyspnea or SOB noted. HEENT: PERRL. Neck is supple, trachea midline. No lymphadenopathy noted. Decreased ROM with flexion and extension of low back at extremes. Tender with palpation to lumbar spine positive provocative maneuvers on the Rt. Not too tender over SI joint today. Tightness appreciated over left lower lumbar paraspinal muscles. Tattoos noted. Negative SLR. Tightness in both hamstrings noted. Rt knee without pain with palpation. Crepitus with ROM of Lt knee. Full range of motion. Balance and coordination normal.  Strength functional for ambulation. Rt shoulder with decreased ROM and strength noted. Tenderness with palpation to anterior Rt shoulder continues. Cranial nerves II-XII are intact. Assessment:      Diagnosis Orders   1. Sacroiliitis, not elsewhere classified ([facility])  traMADol (ULTRAM) 50 MG tablet      2. Lumbosacral spondylosis without myelopathy  traMADol (ULTRAM) 50 MG tablet      3. Lumbar radiculopathy  traMADol (ULTRAM) 50 MG tablet      4. Chronic pain syndrome  traMADol (ULTRAM) 50 MG tablet      5.  Encounter for long-term opiate analgesic use  traMADol (ULTRAM) 50 MG tablet Plan:     Periodic Controlled Substance Monitoring: Possible medication side effects, risk of tolerance/dependence & alternative treatments discussed., No signs of potential drug abuse or diversion identified. , Assessed functional status., Obtaining appropriate analgesic effect of treatment. (Carlee Rendon, APRN - CNP)    Orders Placed This Encounter   Medications    traMADol (ULTRAM) 50 MG tablet     Sig: Take 1 tablet by mouth See Admin Instructions for 60 days. Take 1-2 tabs po PRN pain (#40 tabs for 30 days)     Dispense:  40 tablet     Refill:  1     Reduce doses taken as pain becomes manageable         No orders of the defined types were placed in this encounter. Discussed options with the patient today. Anatomic model pathology was shown and reviewed with pt. Continue tramadol to 1-2 daily PRN pain (#40 tabs for 30 days). Hope to reduce after RF ablation as discussed. He will start PT for Rt shoulder as discussed - order faxed to [facility] last month according to chart. Will check on RF ablation. All questions were answered. Discussed home exercise program.  Relevant imaging and pain generators reviewed. Pt verbalized understanding and agrees with above plan. Pt has chronic pain. Utox reviewed from June 2022 - appropriate for tramadol but also low levels of THC - has medical marijuana. ORT score 1 - low risk. Narcan Rx declined    Will continue medications for chronic pain that has been previously directed as they do help pt function with ADL and improve quality of life. Pt is aware goal is to use the least amount of medication possible to allow pt to function and help with quality of life as discussed in detail. Ideal to keep MME below 50 which it is. Have discussed proper disposal of narcotic medication. Advised to have medications in safe place and locked up/in lock box.   Discussed possible risks of opiate medication with pt, including, but not limited to possible constipation, nausea or vomiting, sedation, urinary retention, dependence and possible addiction. Pt agrees to use medication as prescribed/as directed. Pt advised to not use opiates while driving or operating heavy equipment, or in situations where pt may harm him/herself or others. Pt is aware that while on narcotics, pt needs to be seen to reassess pain and reassess need for continued medication at that time. NDP reviewed. OARRS was reviewed. This NP saw pt under direct supervision of Dr. Cass Madrigal. Follow up:  Return in about 2 months (around 1/22/2023) for f/u after procedure and reassess pain/medications.     Derrick Land, APRN - CNP

## 2022-12-01 ENCOUNTER — TELEPHONE (OUTPATIENT)
Dept: PAIN MANAGEMENT | Age: 42
End: 2022-12-01

## 2022-12-01 NOTE — TELEPHONE ENCOUNTER
NEVIN SHUKLA ISSUE WITH [employer] HAS NOT BEEN RESOLVED YET. CURRENTLY THEIR ARE HIGHER UP PEOPLE WITHIN [facility] WORKING ON THIS ISSUE ALONG WITH THE  OF [employer].

## 2022-12-27 DIAGNOSIS — G89.4 CHRONIC PAIN SYNDROME: ICD-10-CM

## 2022-12-27 DIAGNOSIS — M46.1 SACROILIITIS, NOT ELSEWHERE CLASSIFIED (HCC): ICD-10-CM

## 2022-12-27 DIAGNOSIS — Z79.891 ENCOUNTER FOR LONG-TERM OPIATE ANALGESIC USE: ICD-10-CM

## 2022-12-27 DIAGNOSIS — M47.817 LUMBOSACRAL SPONDYLOSIS WITHOUT MYELOPATHY: ICD-10-CM

## 2022-12-27 DIAGNOSIS — M54.16 LUMBAR RADICULOPATHY: ICD-10-CM

## 2022-12-27 RX ORDER — TRAMADOL HYDROCHLORIDE 50 MG/1
50 TABLET ORAL SEE ADMIN INSTRUCTIONS
Qty: 40 TABLET | Refills: 1 | Status: SHIPPED | OUTPATIENT
Start: 2022-12-27 | End: 2023-02-25

## 2022-12-27 NOTE — TELEPHONE ENCOUNTER
Requested Prescriptions     Pending Prescriptions Disp Refills    traMADol (ULTRAM) 50 MG tablet 40 tablet 1     Sig: Take 1 tablet by mouth See Admin Instructions for 60 days.  Take 1-2 tabs po PRN pain (#40 tabs for 30 days)       Patient last seen on:  11/22/22  Date of last surgery:  n/a  Date of last refill:  11/23/22  Pain level:  n/a  Patient complaining of:  pt requesting refill  Future appts: 1/19/23

## 2023-01-19 ENCOUNTER — OFFICE VISIT (OUTPATIENT)
Dept: PAIN MANAGEMENT | Age: 43
End: 2023-01-19
Payer: COMMERCIAL

## 2023-01-19 VITALS
WEIGHT: 220 LBS | HEIGHT: 69 IN | TEMPERATURE: 97.1 F | BODY MASS INDEX: 32.58 KG/M2 | DIASTOLIC BLOOD PRESSURE: 76 MMHG | SYSTOLIC BLOOD PRESSURE: 132 MMHG

## 2023-01-19 DIAGNOSIS — G89.4 CHRONIC PAIN SYNDROME: ICD-10-CM

## 2023-01-19 DIAGNOSIS — M54.16 LUMBAR RADICULOPATHY: ICD-10-CM

## 2023-01-19 DIAGNOSIS — M47.817 LUMBOSACRAL SPONDYLOSIS WITHOUT MYELOPATHY: ICD-10-CM

## 2023-01-19 DIAGNOSIS — M46.1 SACROILIITIS, NOT ELSEWHERE CLASSIFIED (HCC): ICD-10-CM

## 2023-01-19 DIAGNOSIS — Z79.891 ENCOUNTER FOR LONG-TERM OPIATE ANALGESIC USE: ICD-10-CM

## 2023-01-19 PROCEDURE — 99214 OFFICE O/P EST MOD 30 MIN: CPT | Performed by: NURSE PRACTITIONER

## 2023-01-19 RX ORDER — TRAMADOL HYDROCHLORIDE 50 MG/1
50 TABLET ORAL SEE ADMIN INSTRUCTIONS
Qty: 40 TABLET | Refills: 0 | Status: SHIPPED | OUTPATIENT
Start: 2023-01-26 | End: 2023-02-25

## 2023-01-19 ASSESSMENT — ENCOUNTER SYMPTOMS
CONSTIPATION: 0
EYES NEGATIVE: 1
NAUSEA: 0
SHORTNESS OF BREATH: 0
DIARRHEA: 0
COUGH: 0
GASTROINTESTINAL NEGATIVE: 1
BACK PAIN: 1

## 2023-01-19 NOTE — PROGRESS NOTES
Daja Thorpe  (1980)    1/19/2023    Subjective:     Daja Thorpe is 38 y.o. male who complains today of:    Chief Complaint   Patient presents with    Back Pain         Allergies:  Patient has no known allergies. History reviewed. No pertinent past medical history. History reviewed. No pertinent surgical history. History reviewed. No pertinent family history. Social History     Socioeconomic History    Marital status:      Spouse name: Not on file    Number of children: Not on file    Years of education: Not on file    Highest education level: Not on file   Occupational History    Not on file   Tobacco Use    Smoking status: Never    Smokeless tobacco: Never   Substance and Sexual Activity    Alcohol use: Yes     Alcohol/week: 0.0 standard drinks    Drug use: No    Sexual activity: Not on file   Other Topics Concern    Not on file   Social History Narrative    Not on file     Social Determinants of Health     Financial Resource Strain: Not on file   Food Insecurity: Not on file   Transportation Needs: Not on file   Physical Activity: Not on file   Stress: Not on file   Social Connections: Not on file   Intimate Partner Violence: Not on file   Housing Stability: Not on file       Current Outpatient Medications on File Prior to Visit   Medication Sig Dispense Refill    testosterone enanthate (DELATESTRYL) 200 MG/ML injection Inject 1 mL into the muscle once for 1 dose. 1 ml every 2weeks 10 mL 0     No current facility-administered medications on file prior to visit. Pt presents today for a f/u of his pain. PCP is Dr. Shahbaz Colon MD. Patient last saw this NP. Her Rt L2,3,4,5 RF ablation ordered in recent past.  Evidently there is issue with insurance. He says now changed insurance. Advised to start PT for right shoulder last month. He wanted to do a [facility]. He says this helped for his arm, but this weekend shooting baskets with daughter and had more pain in Rt shoulder.        On 9/27/2022 for right SI joint injection. He felt this helped significantly, but didn't last.  Right shoulder x-ray ordered as well as physical therapy past and possibly see Dr. Norma Fajardo for injection in the future. X-ray report of right shoulder from 9/19/2022 show no fracture, joint spaces preserved. He says he got medical marijuana card and feels this helps with his anxiety. He thinks he can go down to 1 tramadol a day. Seen Dr. Timothy Mills on 4/25/2022 for right L3-4-5 RF ablation. - helped significantly and says helped >80%. He has done PT in the past.      He is having more Rt anterior shoulder pain - says pushing car a couple months ago and felt pop and next day sore with pinch feeling in front of shoulder. Says pain can be \"sharp\". Pain has not gotten much better. Hx of Lt shoulder rotator cuff repairs in the past.      He says the Rt leg pain hasn't been too bad. He says his knee pain is manageable. He has chronic low back and right leg pain. Right leg pain goes down into the foot at times. He works full time. He has left lami with Dr Jazmin Apodaca years ago. Rt L5-S1 cornelius denied by his insurance - needs MRI/PT. Hx Rt L5-S1 cornelius, SI joint injections and Rt L2,3,4,5 RF ablation in the past all with relief and to allow him to be more active. Uses a right knee brace at times. He is vaccinated against Covid. He has not been using his Lyrica 75mg. He continues tramadol 50mg 1-2 tabs daily PRN. Pt feels pain level with his medication is 6/10, and worse without medication. Pt feels that work, basketball shooting hoops, weather change makes the pain worse, and past RF ablation, medication makes the pain better. Pt feels his medication helps   him function and improve his quality of life, specifically says allows to work and do ADL's. Pt denies radiating numbness and tingling. Denies recent falls, injuries or trauma. Pt denies new weakness.  Pt reports PT has been done in the past for low back and completed recent PT for Rt shoulder. Review of Systems   Constitutional:  Negative for fever. HENT: Negative. Eyes: Negative. Respiratory:  Negative for cough and shortness of breath. Cardiovascular:  Negative for chest pain. Gastrointestinal: Negative. Negative for constipation, diarrhea and nausea. Endocrine: Negative. Genitourinary: Negative. Musculoskeletal:  Positive for arthralgias and back pain. Skin: Negative. Neurological:  Negative for dizziness and weakness. Psychiatric/Behavioral: Negative. Objective:     Vitals:  /76   Temp 97.1 °F (36.2 °C)   Ht 5' 9\" (1.753 m)   Wt 220 lb (99.8 kg)   BMI 32.49 kg/m² Pain Score:   6      Physical Exam  Vitals and nursing note reviewed. This is a pleasant male who answers questions appropriately and follows commands. Pt is alert and oriented x 3. Recent and remote memory is intact. Mood and affect, judgement and insight are normal.  No signs of distress, no dyspnea or SOB noted. HEENT: PERRL. Neck is supple, trachea midline. No lymphadenopathy noted. Decreased ROM with flexion and extension of low back at extremes. Tender with palpation to lumbar spine positive provocative maneuvers on the Rt. Not too tender over SI joint today. Tightness appreciated over left lower lumbar paraspinal muscles. Tattoos noted. Negative SLR. Tightness in both hamstrings noted. Rt knee without pain with palpation. Crepitus with ROM of Lt knee. Full range of motion. Balance and coordination normal.  Strength functional for ambulation. Rt shoulder with decreased ROM and strength noted. Tenderness with palpation to anterior Rt shoulder continues. Cranial nerves II-XII are intact. Assessment:      Diagnosis Orders   1. Sacroiliitis, not elsewhere classified (HCC)  traMADol (ULTRAM) 50 MG tablet      2. Lumbosacral spondylosis without myelopathy  traMADol (ULTRAM) 50 MG tablet      3.  Lumbar radiculopathy traMADol (ULTRAM) 50 MG tablet      4. Chronic pain syndrome  traMADol (ULTRAM) 50 MG tablet      5. Encounter for long-term opiate analgesic use  traMADol (ULTRAM) 50 MG tablet          Plan:     Periodic Controlled Substance Monitoring: Possible medication side effects, risk of tolerance/dependence & alternative treatments discussed., No signs of potential drug abuse or diversion identified. , Assessed functional status., Obtaining appropriate analgesic effect of treatment. (Carlee Rendon, APRN - CNP)    Orders Placed This Encounter   Medications    traMADol (ULTRAM) 50 MG tablet     Sig: Take 1 tablet by mouth See Admin Instructions for 30 days. Do not fill until 1/26/23. Take 1-2 tabs po PRN pain (#40 tabs for 30 days)     Dispense:  40 tablet     Refill:  0     Reduce doses taken as pain becomes manageable       No orders of the defined types were placed in this encounter. Discussed options with the patient today. Anatomic model pathology was shown and reviewed with pt. Continue tramadol to 1-2 daily PRN pain (#40 tabs for 30 days). Hope to reduce after RF ablation as discussed. Will check on approval for RF ablation. All questions were answered. Discussed home exercise program.  Relevant imaging and pain generators reviewed. Pt verbalized understanding and agrees with above plan. Pt has chronic pain. Utox reviewed from June 2022 - appropriate for tramadol but also low levels of THC - has medical marijuana. ORT score 1 - low risk. Narcan Rx declined    Will continue medications for chronic pain that has been previously directed as they do help pt function with ADL and improve quality of life. Pt is aware goal is to use the least amount of medication possible to allow pt to function and help with quality of life as discussed in detail. Ideal to keep MME below 50 which it is. Have discussed proper disposal of narcotic medication. Advised to have medications in safe place and locked up/in lock box. Discussed possible risks of opiate medication with pt, including, but not limited to possible constipation, nausea or vomiting, sedation, urinary retention, dependence and possible addiction. Pt agrees to use medication as prescribed/as directed. Pt advised to not use opiates while driving or operating heavy equipment, or in situations where pt may harm him/herself or others. Pt is aware that while on narcotics, pt needs to be seen to reassess pain and reassess need for continued medication at that time. NDP reviewed. OARRS was reviewed. This NP saw pt under direct supervision of Dr. Pablo Canas. Follow up:  Return in about 5 weeks (around 2/23/2023) for review meds and reassess pain.     Shelley Land, APRN - CNP

## 2023-01-31 ENCOUNTER — TELEPHONE (OUTPATIENT)
Dept: PAIN MANAGEMENT | Age: 43
End: 2023-01-31

## 2023-01-31 NOTE — TELEPHONE ENCOUNTER
CALLED [facility] AND SPOKE WITH ROSITA  SHE STATES THAT THIS CASE IS STILL PENDING ELIGIBILITY STATUS  SHE STATES THAT AT THE BEGINNING OF THE YEAR, SO MANY PEOPLE SWITCHED TO [employer]IG[employer], AND [employer] DID NOT FORWARD THE INFORMATION OVER TO [facility], SO SHE STATES THAT THERE IS A DEDICATED TEAM THAT HANDLES CHECKING TO MAKE SURE THE MEMBER IS ACTIVE WITH THEIR INSURANCE. SHE STATES THAT THERE IS NO TIME FRAME WHEN THIS WOULD BE DONE. BUT SHE STATES THAT  [facility] BE MUCH LONGER.        CALLED PATIENT AND LET HIM KNOW ABOVE INFORMATION

## 2023-02-23 ENCOUNTER — OFFICE VISIT (OUTPATIENT)
Dept: PAIN MANAGEMENT | Age: 43
End: 2023-02-23
Payer: COMMERCIAL

## 2023-02-23 VITALS
SYSTOLIC BLOOD PRESSURE: 126 MMHG | BODY MASS INDEX: 32.58 KG/M2 | HEIGHT: 69 IN | WEIGHT: 220 LBS | DIASTOLIC BLOOD PRESSURE: 84 MMHG | TEMPERATURE: 96.9 F

## 2023-02-23 DIAGNOSIS — M54.16 LUMBAR RADICULOPATHY: ICD-10-CM

## 2023-02-23 DIAGNOSIS — G89.4 CHRONIC PAIN SYNDROME: ICD-10-CM

## 2023-02-23 DIAGNOSIS — Z79.891 ENCOUNTER FOR LONG-TERM OPIATE ANALGESIC USE: ICD-10-CM

## 2023-02-23 DIAGNOSIS — M47.817 LUMBOSACRAL SPONDYLOSIS WITHOUT MYELOPATHY: ICD-10-CM

## 2023-02-23 DIAGNOSIS — M46.1 SACROILIITIS, NOT ELSEWHERE CLASSIFIED (HCC): ICD-10-CM

## 2023-02-23 PROCEDURE — 99214 OFFICE O/P EST MOD 30 MIN: CPT | Performed by: NURSE PRACTITIONER

## 2023-02-23 RX ORDER — TRAMADOL HYDROCHLORIDE 50 MG/1
50 TABLET ORAL SEE ADMIN INSTRUCTIONS
Qty: 45 TABLET | Refills: 1 | Status: SHIPPED | OUTPATIENT
Start: 2023-02-23 | End: 2023-04-24

## 2023-02-23 RX ORDER — PREGABALIN 75 MG/1
75 CAPSULE ORAL 2 TIMES DAILY
Qty: 60 CAPSULE | Refills: 1 | Status: SHIPPED | OUTPATIENT
Start: 2023-02-23 | End: 2023-04-24

## 2023-02-23 ASSESSMENT — ENCOUNTER SYMPTOMS
COUGH: 0
BACK PAIN: 1
CONSTIPATION: 0
GASTROINTESTINAL NEGATIVE: 1
SHORTNESS OF BREATH: 0
DIARRHEA: 0
EYES NEGATIVE: 1
NAUSEA: 0

## 2023-02-23 NOTE — PROGRESS NOTES
Tiffanie Brooke  (1980)    2/23/2023    Subjective:     Tiffanie Brooke is 38 y.o. male who complains today of:    Chief Complaint   Patient presents with    Back Pain         Allergies:  Patient has no known allergies. History reviewed. No pertinent past medical history. History reviewed. No pertinent surgical history. History reviewed. No pertinent family history. Social History     Socioeconomic History    Marital status:      Spouse name: Not on file    Number of children: Not on file    Years of education: Not on file    Highest education level: Not on file   Occupational History    Not on file   Tobacco Use    Smoking status: Never    Smokeless tobacco: Never   Substance and Sexual Activity    Alcohol use: Yes     Alcohol/week: 0.0 standard drinks    Drug use: No    Sexual activity: Not on file   Other Topics Concern    Not on file   Social History Narrative    Not on file     Social Determinants of Health     Financial Resource Strain: Not on file   Food Insecurity: Not on file   Transportation Needs: Not on file   Physical Activity: Not on file   Stress: Not on file   Social Connections: Not on file   Intimate Partner Violence: Not on file   Housing Stability: Not on file       Current Outpatient Medications on File Prior to Visit   Medication Sig Dispense Refill    testosterone enanthate (DELATESTRYL) 200 MG/ML injection Inject 1 mL into the muscle once for 1 dose. 1 ml every 2weeks 10 mL 0     No current facility-administered medications on file prior to visit. Pt presents today for a f/u of his pain. PCP is Dr. Lorena Hamman, MD. Patient last saw this NP. He says evidently Rt L2,3,4,5 RF ablation was denied by insurance. He actually now started a new job and is getting new insurance. He says it is less stress which is great. He did do some PT for right shoulder at [facility]. He says this helped, but now having some more Sx. He says his low back pain is bad.  He says he is getting a lot of \"tightness\" in his legs that is difficult. He is really frustrated that his RF ablation was denied. He says he has been using more tramadol d/t pain as well. On 9/27/2022 for right SI joint injection. He felt this helped significantly, but didn't last.  Right shoulder x-ray ordered as well as physical therapy past and possibly see Dr. Koko Marie for injection in the future. X-ray report of right shoulder from 9/19/2022 show no fracture, joint spaces preserved. He says he got medical marijuana card and feels this helps with his anxiety. Seen Dr. Katt Lewis on 4/25/2022 for right L3-4-5 RF ablation. - helped significantly and says helped >80%. He has done PT in the past. Hx of Lt shoulder rotator cuff repairs in the past.       He says his knee pain is manageable. He has chronic low back and right leg pain. Right leg pain goes down into the foot at times. He works full time. He has left lami with Dr Brian Peraza years ago. Rt L5-S1 cornelius denied by his insurance - needs MRI/PT. Hx Rt L5-S1 cornelius, SI joint injections and Rt L2,3,4,5 RF ablation in the past all with relief and to allow him to be more active. Uses a right knee brace at times. He continues tramadol 50mg 1-2 tabs daily PRN. Pt feels pain level with his medication is 1-4/10, and 7/10 without medication. Pt feels that work, weather change, lack of RF ablation makes the pain worse, and stretching, rubbing legs, past RF ablation,  medication makes the pain better. Pt feels his medication helps   him function and improve his quality of life, specifically says allows to work and do ADL's. Pt denies radiating numbness and tingling. Denies recent falls, injuries or trauma. Pt denies new weakness. Pt reports PT has been done. Review of Systems   Constitutional:  Negative for fever. HENT: Negative. Eyes: Negative. Respiratory:  Negative for cough and shortness of breath. Cardiovascular:  Negative for chest pain. Gastrointestinal: Negative. Negative for constipation, diarrhea and nausea. Endocrine: Negative. Genitourinary: Negative. Musculoskeletal:  Positive for arthralgias and back pain. Skin: Negative. Neurological:  Negative for dizziness and weakness. Psychiatric/Behavioral: Negative. Objective:     Vitals:  /84   Temp 96.9 °F (36.1 °C)   Ht 5' 9\" (1.753 m)   Wt 220 lb (99.8 kg)   BMI 32.49 kg/m² Pain Score:   7      Physical Exam  Vitals and nursing note reviewed. This is a pleasant male who answers questions appropriately and follows commands. Pt is alert and oriented x 3. Recent and remote memory is intact. Mood and affect, judgement and insight are normal.  No signs of distress, no dyspnea or SOB noted. HEENT: PERRL. Neck is supple, trachea midline. No lymphadenopathy noted. Decreased ROM with flexion and extension of low back at extremes. TTP over lumbar spine positive provocative maneuvers on the Rt. Not too tender over SI joint today. Tightness appreciated over left lower lumbar paraspinal muscles. Tattoos noted. Negative SLR. Tightness in both hamstrings noted. Rt knee without pain with palpation. Crepitus with ROM of Lt knee. Full range of motion. Balance and coordination normal.  Strength functional for ambulation. Cranial nerves II-XII are intact. Assessment:      Diagnosis Orders   1. Sacroiliitis, not elsewhere classified (HCC)  traMADol (ULTRAM) 50 MG tablet      2. Lumbosacral spondylosis without myelopathy  traMADol (ULTRAM) 50 MG tablet    pregabalin (LYRICA) 75 MG capsule      3. Lumbar radiculopathy  traMADol (ULTRAM) 50 MG tablet    pregabalin (LYRICA) 75 MG capsule      4. Chronic pain syndrome  traMADol (ULTRAM) 50 MG tablet      5.  Encounter for long-term opiate analgesic use  traMADol (ULTRAM) 50 MG tablet          Plan:     Periodic Controlled Substance Monitoring: Possible medication side effects, risk of tolerance/dependence & alternative treatments discussed., No signs of potential drug abuse or diversion identified. , Assessed functional status., Obtaining appropriate analgesic effect of treatment. (Carlee Rendon, APRN - CNP)    Orders Placed This Encounter   Medications    traMADol (ULTRAM) 50 MG tablet     Sig: Take 1 tablet by mouth See Admin Instructions for 60 days. Take 1-2 tabs po PRN pain (#45 tabs for 30 days)     Dispense:  45 tablet     Refill:  1     Reduce doses taken as pain becomes manageable    pregabalin (LYRICA) 75 MG capsule     Sig: Take 1 capsule by mouth 2 times daily for 60 days. Dispense:  60 capsule     Refill:  1       No orders of the defined types were placed in this encounter. Discussed options with the patient today. Anatomic model pathology was shown and reviewed with pt. We will continue his tramadol 50 mg and increase slightly due to increased pain and denial of RF ablation which helped significantly in the past and since worn off. He has done PT in the past.  We will increase his tramadol by 5 tablets #45 for 30 days and give him a refill as well. He started a new job and will be without insurance next month. Restart Lyrica 75 mg start at night and advance as tolerated to twice a day. All questions were answered. Discussed home exercise program.  Relevant imaging and pain generators reviewed. Pt verbalized understanding and agrees with above plan. Pt has chronic pain. Utox reviewed from June 2022 - appropriate for tramadol but also low levels of THC - has medical marijuana. ORT score 1 - low risk. Narcan Rx declined    Will continue medications for chronic pain that has been previously directed as they do help pt function with ADL and improve quality of life. Pt is aware goal is to use the least amount of medication possible to allow pt to function and help with quality of life as discussed in detail. Ideal to keep MME below 50. Have discussed proper disposal of narcotic medication. Advised to have medications in safe place and locked up/in lock box. Discussed possible risks of opiate medication with pt, including, but not limited to possible constipation, nausea or vomiting, sedation, urinary retention, dependence and possible addiction. Pt agrees to use medication as prescribed/as directed. Pt advised to not use opiates while driving or operating heavy equipment, or in situations where pt may harm him/herself or others. Pt is aware that while on narcotics, pt needs to be seen to reassess pain and reassess need for continued medication at that time. NDP reviewed. OARRS was reviewed. This NP saw pt under direct supervision of Dr. Rivera Ulrich. Follow up:  Return in about 2 months (around 4/23/2023) for review meds and reassess pain.     Johnathon Land, LOW - CNP

## 2023-04-03 DIAGNOSIS — M47.817 LUMBOSACRAL SPONDYLOSIS WITHOUT MYELOPATHY: ICD-10-CM

## 2023-04-03 DIAGNOSIS — M54.16 LUMBAR RADICULOPATHY: ICD-10-CM

## 2023-04-03 DIAGNOSIS — G89.4 CHRONIC PAIN SYNDROME: ICD-10-CM

## 2023-04-03 DIAGNOSIS — Z79.891 ENCOUNTER FOR LONG-TERM OPIATE ANALGESIC USE: ICD-10-CM

## 2023-04-03 DIAGNOSIS — M46.1 SACROILIITIS, NOT ELSEWHERE CLASSIFIED (HCC): ICD-10-CM

## 2023-04-03 RX ORDER — TRAMADOL HYDROCHLORIDE 50 MG/1
50 TABLET ORAL SEE ADMIN INSTRUCTIONS
Qty: 45 TABLET | Refills: 1 | Status: SHIPPED | OUTPATIENT
Start: 2023-04-03 | End: 2023-06-02

## 2023-04-03 NOTE — TELEPHONE ENCOUNTER
Requested Prescriptions     Pending Prescriptions Disp Refills    traMADol (ULTRAM) 50 MG tablet 45 tablet 1     Sig: Take 1 tablet by mouth See Admin Instructions for 60 days. Take 1-2 tabs po PRN pain (#45 tabs for 30 days)       Patient rescheduled to next month due to his insurance changing, patient is asking if Dr Enoc Monae will send his meds.      Last seen 2/23/23  Next appt 5/1/23

## 2023-05-01 ENCOUNTER — OFFICE VISIT (OUTPATIENT)
Dept: PAIN MANAGEMENT | Age: 43
End: 2023-05-01
Payer: COMMERCIAL

## 2023-05-01 VITALS
WEIGHT: 220 LBS | SYSTOLIC BLOOD PRESSURE: 132 MMHG | TEMPERATURE: 97.3 F | BODY MASS INDEX: 32.58 KG/M2 | HEIGHT: 69 IN | DIASTOLIC BLOOD PRESSURE: 80 MMHG

## 2023-05-01 DIAGNOSIS — Z79.891 ENCOUNTER FOR LONG-TERM OPIATE ANALGESIC USE: ICD-10-CM

## 2023-05-01 DIAGNOSIS — M46.1 SACROILIITIS, NOT ELSEWHERE CLASSIFIED (HCC): ICD-10-CM

## 2023-05-01 DIAGNOSIS — M47.817 LUMBOSACRAL SPONDYLOSIS WITHOUT MYELOPATHY: Primary | ICD-10-CM

## 2023-05-01 DIAGNOSIS — M54.16 LUMBAR RADICULOPATHY: ICD-10-CM

## 2023-05-01 DIAGNOSIS — G89.4 CHRONIC PAIN SYNDROME: ICD-10-CM

## 2023-05-01 PROCEDURE — 99214 OFFICE O/P EST MOD 30 MIN: CPT | Performed by: NURSE PRACTITIONER

## 2023-05-01 RX ORDER — PREGABALIN 75 MG/1
75 CAPSULE ORAL 2 TIMES DAILY
Qty: 60 CAPSULE | Refills: 0 | Status: SHIPPED | OUTPATIENT
Start: 2023-05-01 | End: 2023-05-31

## 2023-05-01 ASSESSMENT — ENCOUNTER SYMPTOMS
COUGH: 0
NAUSEA: 0
CONSTIPATION: 0
BACK PAIN: 1
GASTROINTESTINAL NEGATIVE: 1
SHORTNESS OF BREATH: 0
EYES NEGATIVE: 1
DIARRHEA: 0

## 2023-05-01 NOTE — PROGRESS NOTES
Radha Herron  (1980)    5/1/2023    Subjective:     Radha Herron is 38 y.o. male who complains today of:    Chief Complaint   Patient presents with    Back Pain         Allergies:  Patient has no known allergies. History reviewed. No pertinent past medical history. History reviewed. No pertinent surgical history. History reviewed. No pertinent family history. Social History     Socioeconomic History    Marital status:      Spouse name: Not on file    Number of children: Not on file    Years of education: Not on file    Highest education level: Not on file   Occupational History    Not on file   Tobacco Use    Smoking status: Never    Smokeless tobacco: Never   Substance and Sexual Activity    Alcohol use: Yes     Alcohol/week: 0.0 standard drinks    Drug use: No    Sexual activity: Not on file   Other Topics Concern    Not on file   Social History Narrative    Not on file     Social Determinants of Health     Financial Resource Strain: Not on file   Food Insecurity: Not on file   Transportation Needs: Not on file   Physical Activity: Not on file   Stress: Not on file   Social Connections: Not on file   Intimate Partner Violence: Not on file   Housing Stability: Not on file       Current Outpatient Medications on File Prior to Visit   Medication Sig Dispense Refill    traMADol (ULTRAM) 50 MG tablet Take 1 tablet by mouth See Admin Instructions for 60 days. Take 1-2 tabs po PRN pain (#45 tabs for 30 days) 45 tablet 1    testosterone enanthate (DELATESTRYL) 200 MG/ML injection Inject 1 mL into the muscle once for 1 dose. 1 ml every 2weeks 10 mL 0     No current facility-administered medications on file prior to visit. Pt presents today for a f/u of his pain. PCP is Dr. Rojelio Tracy MD. He was last seen in February and tramadol was slightly increased due to increased pain.   He has had PT in the past.  Past RF ablation helped significantly greater than 80% allowed him to function for

## 2023-05-02 ENCOUNTER — TELEPHONE (OUTPATIENT)
Dept: PAIN MANAGEMENT | Age: 43
End: 2023-05-02

## 2023-05-09 ENCOUNTER — OFFICE VISIT (OUTPATIENT)
Dept: PAIN MANAGEMENT | Age: 43
End: 2023-05-09
Payer: COMMERCIAL

## 2023-05-09 DIAGNOSIS — M47.817 LUMBOSACRAL SPONDYLOSIS WITHOUT MYELOPATHY: ICD-10-CM

## 2023-05-09 DIAGNOSIS — Z79.891 ENCOUNTER FOR LONG-TERM OPIATE ANALGESIC USE: ICD-10-CM

## 2023-05-09 DIAGNOSIS — M46.1 SACROILIITIS, NOT ELSEWHERE CLASSIFIED (HCC): ICD-10-CM

## 2023-05-09 DIAGNOSIS — M54.16 LUMBAR RADICULOPATHY: ICD-10-CM

## 2023-05-09 DIAGNOSIS — G89.4 CHRONIC PAIN SYNDROME: ICD-10-CM

## 2023-05-09 PROCEDURE — 64636 DESTROY L/S FACET JNT ADDL: CPT | Performed by: PAIN MEDICINE

## 2023-05-09 PROCEDURE — 64635 DESTROY LUMB/SAC FACET JNT: CPT | Performed by: PAIN MEDICINE

## 2023-05-09 RX ORDER — LIDOCAINE HYDROCHLORIDE 10 MG/ML
10 INJECTION, SOLUTION EPIDURAL; INFILTRATION; INTRACAUDAL; PERINEURAL ONCE
Status: COMPLETED | OUTPATIENT
Start: 2023-05-09 | End: 2023-05-09

## 2023-05-09 RX ORDER — TRAMADOL HYDROCHLORIDE 50 MG/1
50 TABLET ORAL SEE ADMIN INSTRUCTIONS
Qty: 45 TABLET | Refills: 0 | Status: SHIPPED | OUTPATIENT
Start: 2023-05-09 | End: 2023-07-08

## 2023-05-09 RX ORDER — BETAMETHASONE SODIUM PHOSPHATE AND BETAMETHASONE ACETATE 3; 3 MG/ML; MG/ML
6 INJECTION, SUSPENSION INTRA-ARTICULAR; INTRALESIONAL; INTRAMUSCULAR; SOFT TISSUE ONCE
Status: COMPLETED | OUTPATIENT
Start: 2023-05-09 | End: 2023-05-09

## 2023-05-09 RX ADMIN — LIDOCAINE HYDROCHLORIDE 10 MG: 10 INJECTION, SOLUTION EPIDURAL; INFILTRATION; INTRACAUDAL; PERINEURAL at 14:11

## 2023-05-09 RX ADMIN — BETAMETHASONE SODIUM PHOSPHATE AND BETAMETHASONE ACETATE 6 MG: 3; 3 INJECTION, SUSPENSION INTRA-ARTICULAR; INTRALESIONAL; INTRAMUSCULAR; SOFT TISSUE at 14:12

## 2023-05-09 NOTE — PROGRESS NOTES
[facility]) Physicians  Neurosurgery and Pain [address] [facility] [place], [place]: [phone]  F: [phone]      Lumbar Radio Frequency Ablation     Provider: Dominique Allison DO          Patient Name: Sim Michelle : 1980        Date: 2023      Sim Michelle is here today for interventional pain management. Standard ASIPP guidelines were followed and sterile technique used. Area was cleaned with Betadine x3. Informed consent was obtained. Fluoroscopic guidance was used for this procedure. Multiple views of fluoroscopy were used during procedure to assist with needle placement. Appropriate sized RF 10mm active tip needle was used and advance to appropriate anatomic location. There was appropriate multifidus contraction noted with motor stimulation at 2 Hz between 0.5-1.5 volts. No limb or gluteal contraction was noted taking it up to 3.5 volts. Prior to lesioning at 80 degrees Celsius for 90 seconds, approximately 0.75mg/1mg of Celestone and ½ cc of 1% preservative free Lidocaine was injected. Impedance was between 200-500 ohms during the procedure. Patient tolerated the procedure well, no obvious complications occurred during the procedure. Patient was appropriately monitored and discharged home in stable condition with their usual motor strength. Post Op instructions were given to patient.           [] Bilateral [] T11 [] L1 [] S1     [] T12 [] L2 [] S2    [x] Right  [x] L3 [] S3      [x] L4 [] S4    [] Left  [x] L5                              Dominique Allison DO

## 2023-06-01 ENCOUNTER — OFFICE VISIT (OUTPATIENT)
Dept: PAIN MANAGEMENT | Age: 43
End: 2023-06-01
Payer: COMMERCIAL

## 2023-06-01 VITALS
TEMPERATURE: 98.3 F | HEIGHT: 69 IN | BODY MASS INDEX: 32.58 KG/M2 | SYSTOLIC BLOOD PRESSURE: 126 MMHG | WEIGHT: 220 LBS | DIASTOLIC BLOOD PRESSURE: 88 MMHG

## 2023-06-01 DIAGNOSIS — M47.817 LUMBOSACRAL SPONDYLOSIS WITHOUT MYELOPATHY: ICD-10-CM

## 2023-06-01 DIAGNOSIS — G89.4 CHRONIC PAIN SYNDROME: ICD-10-CM

## 2023-06-01 DIAGNOSIS — Z79.891 ENCOUNTER FOR LONG-TERM OPIATE ANALGESIC USE: ICD-10-CM

## 2023-06-01 DIAGNOSIS — M46.1 SACROILIITIS, NOT ELSEWHERE CLASSIFIED (HCC): ICD-10-CM

## 2023-06-01 DIAGNOSIS — M54.16 LUMBAR RADICULOPATHY: ICD-10-CM

## 2023-06-01 PROCEDURE — 99214 OFFICE O/P EST MOD 30 MIN: CPT | Performed by: NURSE PRACTITIONER

## 2023-06-01 RX ORDER — PREGABALIN 75 MG/1
75 CAPSULE ORAL 2 TIMES DAILY
Qty: 60 CAPSULE | Refills: 1 | Status: SHIPPED | OUTPATIENT
Start: 2023-06-01 | End: 2023-07-31

## 2023-06-01 RX ORDER — TRAMADOL HYDROCHLORIDE 50 MG/1
50 TABLET ORAL SEE ADMIN INSTRUCTIONS
Qty: 40 TABLET | Refills: 1 | Status: SHIPPED | OUTPATIENT
Start: 2023-06-08 | End: 2023-08-07

## 2023-06-01 ASSESSMENT — ENCOUNTER SYMPTOMS
EYES NEGATIVE: 1
SHORTNESS OF BREATH: 0
GASTROINTESTINAL NEGATIVE: 1
BACK PAIN: 1
DIARRHEA: 0
CONSTIPATION: 0
COUGH: 0
NAUSEA: 0

## 2023-07-21 DIAGNOSIS — Z79.891 ENCOUNTER FOR LONG-TERM OPIATE ANALGESIC USE: ICD-10-CM

## 2023-07-21 DIAGNOSIS — M47.817 LUMBOSACRAL SPONDYLOSIS WITHOUT MYELOPATHY: ICD-10-CM

## 2023-07-21 DIAGNOSIS — M46.1 SACROILIITIS, NOT ELSEWHERE CLASSIFIED (HCC): ICD-10-CM

## 2023-07-21 DIAGNOSIS — M54.16 LUMBAR RADICULOPATHY: ICD-10-CM

## 2023-07-21 DIAGNOSIS — G89.4 CHRONIC PAIN SYNDROME: ICD-10-CM

## 2023-07-21 RX ORDER — TRAMADOL HYDROCHLORIDE 50 MG/1
50 TABLET ORAL SEE ADMIN INSTRUCTIONS
Qty: 15 TABLET | Refills: 1 | Status: SHIPPED | OUTPATIENT
Start: 2023-07-21 | End: 2023-08-03

## 2023-07-21 NOTE — TELEPHONE ENCOUNTER
Requested Prescriptions     Pending Prescriptions Disp Refills    traMADol (ULTRAM) 50 MG tablet 40 tablet 1     Sig: Take 1 tablet by mouth See Admin Instructions for 13 days. Do  not fill until 6/8/23 - Take 1-2 tabs po PRN pain (#40 tabs for 30 days)       Patient last seen on:  6/1/23  Date of last refill:  6/8/23  Patient complaining of:  Pt request  Future appts: 8/3/23    This is a Dr. Robison Nan patient, will DR. Hurley address?

## 2023-07-24 RX ORDER — TRAMADOL HYDROCHLORIDE 50 MG/1
50 TABLET ORAL SEE ADMIN INSTRUCTIONS
Qty: 40 TABLET | Refills: 1 | OUTPATIENT
Start: 2023-07-24 | End: 2023-09-22

## 2023-08-03 ENCOUNTER — OFFICE VISIT (OUTPATIENT)
Dept: PAIN MANAGEMENT | Age: 43
End: 2023-08-03
Payer: COMMERCIAL

## 2023-08-03 VITALS — BODY MASS INDEX: 34.07 KG/M2 | TEMPERATURE: 97.1 F | HEIGHT: 69 IN | WEIGHT: 230 LBS

## 2023-08-03 DIAGNOSIS — M46.1 SACROILIITIS, NOT ELSEWHERE CLASSIFIED (HCC): ICD-10-CM

## 2023-08-03 DIAGNOSIS — Z79.891 ENCOUNTER FOR LONG-TERM OPIATE ANALGESIC USE: ICD-10-CM

## 2023-08-03 DIAGNOSIS — M47.817 LUMBOSACRAL SPONDYLOSIS WITHOUT MYELOPATHY: ICD-10-CM

## 2023-08-03 DIAGNOSIS — G89.4 CHRONIC PAIN SYNDROME: ICD-10-CM

## 2023-08-03 DIAGNOSIS — M70.71 ISCHIAL BURSITIS OF RIGHT SIDE: Primary | ICD-10-CM

## 2023-08-03 DIAGNOSIS — M54.16 LUMBAR RADICULOPATHY: ICD-10-CM

## 2023-08-03 DIAGNOSIS — M25.511 CHRONIC RIGHT SHOULDER PAIN: ICD-10-CM

## 2023-08-03 DIAGNOSIS — G89.29 CHRONIC RIGHT SHOULDER PAIN: ICD-10-CM

## 2023-08-03 PROCEDURE — 99214 OFFICE O/P EST MOD 30 MIN: CPT | Performed by: NURSE PRACTITIONER

## 2023-08-03 RX ORDER — METHYLPREDNISOLONE 4 MG/1
TABLET ORAL
Qty: 1 KIT | Refills: 0 | Status: SHIPPED | OUTPATIENT
Start: 2023-08-03 | End: 2023-08-09

## 2023-08-03 RX ORDER — TRAMADOL HYDROCHLORIDE 50 MG/1
50 TABLET ORAL SEE ADMIN INSTRUCTIONS
Qty: 40 TABLET | Refills: 1 | Status: SHIPPED | OUTPATIENT
Start: 2023-08-03 | End: 2023-10-02

## 2023-08-03 ASSESSMENT — ENCOUNTER SYMPTOMS
CONSTIPATION: 0
COUGH: 0
BACK PAIN: 1
EYES NEGATIVE: 1
SHORTNESS OF BREATH: 0
NAUSEA: 0
DIARRHEA: 0
GASTROINTESTINAL NEGATIVE: 1

## 2023-08-25 ENCOUNTER — INITIAL CONSULT (OUTPATIENT)
Dept: SPORTS MEDICINE | Age: 43
End: 2023-08-25
Payer: COMMERCIAL

## 2023-08-25 VITALS — HEIGHT: 69 IN | WEIGHT: 233 LBS | BODY MASS INDEX: 34.51 KG/M2

## 2023-08-25 DIAGNOSIS — S43.431A TEAR OF RIGHT GLENOID LABRUM, INITIAL ENCOUNTER: Primary | ICD-10-CM

## 2023-08-25 PROCEDURE — 99204 OFFICE O/P NEW MOD 45 MIN: CPT | Performed by: FAMILY MEDICINE

## 2023-08-25 ASSESSMENT — ENCOUNTER SYMPTOMS
SINUS PAIN: 0
FACIAL SWELLING: 0
CHEST TIGHTNESS: 0
ABDOMINAL DISTENTION: 0
APNEA: 0
EYE DISCHARGE: 0

## 2023-08-25 NOTE — PROGRESS NOTES
[facility] ([facility]) Physicians  Neurosurgery and Pain [facility]  [address] , [address] [address] [facility], [address] [place]: [phone]  F: [phone]      [id number] [facility] [address]  (1980)    8/25/2023    Subjective:     [id number] [facility] [address] is 37 y.o. male who complains today of:    Chief Complaint   Patient presents with    Shoulder Pain     Right        HPI     She comes in complaining of approximately 10 months of right shoulder pain he says he injured it while doing some heavy lifting and he thought it would go away but it continues to bother him says he had problems on the left and had surgery back in the early 2000's and he was doing therapy he says he also done therapy for this issue more recently but it has not called his symptoms he has had naproxen as well as tramadol and has not helped  Allergies:  Patient has no known allergies. History reviewed. No pertinent past medical history. History reviewed. No pertinent surgical history. History reviewed. No pertinent family history. Social History     Socioeconomic History    Marital status:      Spouse name: Not on file    Number of children: Not on file    Years of education: Not on file    Highest education level: Not on file   Occupational History    Not on file   Tobacco Use    Smoking status: Never    Smokeless tobacco: Never   Substance and Sexual Activity    Alcohol use:  Yes     Alcohol/week: 0.0 standard drinks    Drug use: No    Sexual activity: Not on file   Other Topics Concern    Not on file   Social History Narrative    Not on file     Social Determinants of Health     Financial Resource Strain: Not on file   Food Insecurity: Not on file   Transportation Needs: Not on file   Physical Activity: Not on file   Stress: Not on file   Social Connections: Not on file   Intimate Partner Violence: Not on file   Housing Stability: Not on file       Current Outpatient Medications on File Prior to Visit   Medication Sig Dispense Refill

## 2023-09-08 ENCOUNTER — HOSPITAL ENCOUNTER (OUTPATIENT)
Dept: MRI IMAGING | Age: 43
Discharge: HOME | End: 2023-09-08
Payer: COMMERCIAL

## 2023-09-08 DIAGNOSIS — S43.431A TEAR OF RIGHT GLENOID LABRUM, INITIAL ENCOUNTER: ICD-10-CM

## 2023-09-08 PROCEDURE — 73221 MRI JOINT UPR EXTREM W/O DYE: CPT

## 2023-09-15 ENCOUNTER — OFFICE VISIT (OUTPATIENT)
Dept: SPORTS MEDICINE | Age: 43
End: 2023-09-15
Payer: COMMERCIAL

## 2023-09-15 VITALS — HEIGHT: 69 IN | WEIGHT: 233 LBS | BODY MASS INDEX: 34.51 KG/M2 | TEMPERATURE: 97 F

## 2023-09-15 DIAGNOSIS — M24.111 DEGENERATIVE TEAR OF GLENOID LABRUM OF RIGHT SHOULDER: Primary | ICD-10-CM

## 2023-09-15 PROCEDURE — 99213 OFFICE O/P EST LOW 20 MIN: CPT | Performed by: FAMILY MEDICINE

## 2023-09-15 ASSESSMENT — ENCOUNTER SYMPTOMS
ABDOMINAL DISTENTION: 0
APNEA: 0
FACIAL SWELLING: 0
SINUS PAIN: 0
CHEST TIGHTNESS: 0
EYE DISCHARGE: 0

## 2023-09-15 NOTE — PROGRESS NOTES
[facility]) Physicians  Neurosurgery and Pain [facility]  [address] [place], [address] [address] [facility], [address] [place]: [phone]  F: [phone]      [id number] [facility] [address]  (1980)    9/15/2023    Subjective:     [id number] [facility] [address] is 37 y.o. male who complains today of:    Chief Complaint   Patient presents with    Shoulder Pain     Tear of right glenoid labrum, initial encounter       HPI     This patient comes in stating that he still having shoulder pain mostly anterior on the right notice it when he is doing any kind of lifting  Allergies:  Patient has no known allergies. History reviewed. No pertinent past medical history. History reviewed. No pertinent surgical history. History reviewed. No pertinent family history. Social History     Socioeconomic History    Marital status:      Spouse name: Not on file    Number of children: Not on file    Years of education: Not on file    Highest education level: Not on file   Occupational History    Not on file   Tobacco Use    Smoking status: Never    Smokeless tobacco: Never   Substance and Sexual Activity    Alcohol use:  Yes     Alcohol/week: 0.0 standard drinks of alcohol    Drug use: No    Sexual activity: Not on file   Other Topics Concern    Not on file   Social History Narrative    Not on file     Social Determinants of Health     Financial Resource Strain: Low Risk  (12/20/2021)    Overall Financial Resource Strain (CARDIA)     Difficulty of Paying Living Expenses: Not hard at all   Food Insecurity: No Food Insecurity (12/20/2021)    Hunger Vital Sign     Worried About Running Out of Food in the Last Year: Never true     Ran Out of Food in the Last Year: Never true   Transportation Needs: Not on file   Physical Activity: Not on file   Stress: Not on file   Social Connections: Not on file   Intimate Partner Violence: Not on file   Housing Stability: Not on file       Current Outpatient Medications on File Prior to Visit

## 2023-09-22 ENCOUNTER — PROCEDURE VISIT (OUTPATIENT)
Dept: SPORTS MEDICINE | Age: 43
End: 2023-09-22

## 2023-09-22 DIAGNOSIS — M24.111 DEGENERATIVE TEAR OF GLENOID LABRUM OF RIGHT SHOULDER: Primary | ICD-10-CM

## 2023-09-22 RX ORDER — BETAMETHASONE SODIUM PHOSPHATE AND BETAMETHASONE ACETATE 3; 3 MG/ML; MG/ML
12 INJECTION, SUSPENSION INTRA-ARTICULAR; INTRALESIONAL; INTRAMUSCULAR; SOFT TISSUE ONCE
Status: COMPLETED | OUTPATIENT
Start: 2023-09-22 | End: 2023-09-22

## 2023-09-22 RX ORDER — LIDOCAINE HYDROCHLORIDE 10 MG/ML
3 INJECTION, SOLUTION EPIDURAL; INFILTRATION; INTRACAUDAL; PERINEURAL ONCE
Status: COMPLETED | OUTPATIENT
Start: 2023-09-22 | End: 2023-09-22

## 2023-09-22 RX ORDER — BUPIVACAINE HYDROCHLORIDE 2.5 MG/ML
3 INJECTION, SOLUTION EPIDURAL; INFILTRATION; INTRACAUDAL ONCE
Status: COMPLETED | OUTPATIENT
Start: 2023-09-22 | End: 2023-09-22

## 2023-09-22 RX ADMIN — BETAMETHASONE SODIUM PHOSPHATE AND BETAMETHASONE ACETATE 12 MG: 3; 3 INJECTION, SUSPENSION INTRA-ARTICULAR; INTRALESIONAL; INTRAMUSCULAR; SOFT TISSUE at 15:19

## 2023-09-22 RX ADMIN — LIDOCAINE HYDROCHLORIDE 3 ML: 10 INJECTION, SOLUTION EPIDURAL; INFILTRATION; INTRACAUDAL; PERINEURAL at 15:20

## 2023-09-22 RX ADMIN — BUPIVACAINE HYDROCHLORIDE 7.5 MG: 2.5 INJECTION, SOLUTION EPIDURAL; INFILTRATION; INTRACAUDAL at 15:20

## 2023-09-22 NOTE — PROGRESS NOTES
@[facility]@   Spine Surgery  Advanced Pain Management           Provider: Michell Whaley DO          Patient Name: Miriam Kellogg : 1980         Date: 23          PROCEDURE: US Shoulder Injection  Dx degenerative labral tear right shoulder    After informed consent patient was put in a seated position, the right shoulder was exposed and draped. Using msk US the shoulder joint was identified and prepped with Betadine. Using US guidance a 22g needle was used to inject 2cc celestone, 3cc lidocaine, and 3cc marcaine with relief of symptoms.   Pt tolerated procedure well, left stable, told to ice the shoulder today and resume normal activity in 2 days  US images of procedure were saved

## 2023-10-03 ENCOUNTER — OFFICE VISIT (OUTPATIENT)
Dept: PAIN MANAGEMENT | Age: 43
End: 2023-10-03
Payer: COMMERCIAL

## 2023-10-03 VITALS
HEIGHT: 69 IN | WEIGHT: 230 LBS | DIASTOLIC BLOOD PRESSURE: 74 MMHG | SYSTOLIC BLOOD PRESSURE: 128 MMHG | BODY MASS INDEX: 34.07 KG/M2

## 2023-10-03 DIAGNOSIS — Z79.891 ENCOUNTER FOR LONG-TERM OPIATE ANALGESIC USE: ICD-10-CM

## 2023-10-03 DIAGNOSIS — G89.4 CHRONIC PAIN SYNDROME: ICD-10-CM

## 2023-10-03 DIAGNOSIS — M47.817 LUMBOSACRAL SPONDYLOSIS WITHOUT MYELOPATHY: ICD-10-CM

## 2023-10-03 DIAGNOSIS — M46.1 SACROILIITIS, NOT ELSEWHERE CLASSIFIED (HCC): ICD-10-CM

## 2023-10-03 DIAGNOSIS — M54.16 LUMBAR RADICULOPATHY: ICD-10-CM

## 2023-10-03 PROCEDURE — 99214 OFFICE O/P EST MOD 30 MIN: CPT | Performed by: NURSE PRACTITIONER

## 2023-10-03 RX ORDER — TRAMADOL HYDROCHLORIDE 50 MG/1
50 TABLET ORAL SEE ADMIN INSTRUCTIONS
Qty: 40 TABLET | Refills: 0 | Status: SHIPPED | OUTPATIENT
Start: 2023-10-03 | End: 2023-12-02

## 2023-10-03 ASSESSMENT — ENCOUNTER SYMPTOMS
GASTROINTESTINAL NEGATIVE: 1
DIARRHEA: 0
CONSTIPATION: 0
COUGH: 0
BACK PAIN: 1
EYES NEGATIVE: 1
NAUSEA: 0
SHORTNESS OF BREATH: 0

## 2023-10-27 ENCOUNTER — OFFICE VISIT (OUTPATIENT)
Dept: SPORTS MEDICINE | Age: 43
End: 2023-10-27
Payer: COMMERCIAL

## 2023-10-27 VITALS
HEIGHT: 69 IN | TEMPERATURE: 97.7 F | BODY MASS INDEX: 34.07 KG/M2 | SYSTOLIC BLOOD PRESSURE: 116 MMHG | WEIGHT: 230 LBS | DIASTOLIC BLOOD PRESSURE: 72 MMHG

## 2023-10-27 DIAGNOSIS — M75.21 BICEPS TENDINITIS, RIGHT: ICD-10-CM

## 2023-10-27 DIAGNOSIS — G57.01 PIRIFORMIS SYNDROME OF RIGHT SIDE: Primary | ICD-10-CM

## 2023-10-27 PROCEDURE — 99214 OFFICE O/P EST MOD 30 MIN: CPT | Performed by: FAMILY MEDICINE

## 2023-10-27 ASSESSMENT — ENCOUNTER SYMPTOMS
EYE DISCHARGE: 0
FACIAL SWELLING: 0
APNEA: 0
CHEST TIGHTNESS: 0
ABDOMINAL DISTENTION: 0
SINUS PAIN: 0

## 2023-10-27 NOTE — PROGRESS NOTES
[facility] ([facility]) Physicians  Neurosurgery and Pain [facility]  [address] , [address] [address] [facility], [address] [place]: [phone]  F: [phone]      [id number] [facility] [address]  (1980)    10/27/2023    Subjective:     [id number] [facility] [address] is 37 y.o. male who complains today of:    Chief Complaint   Patient presents with    Shoulder Pain     Pt states shoulder inj helped (right side)       HPI     This patient comes in stating that the injection helped but there is a spot on his anterior shoulder that is still bothering him on the right says he is also here for a new issue which is right posterior hip pain he was told by his pain management provider to get checked for possibility of an injection there says had it there for many months and has even had surgery has not helped that  Allergies:  Patient has no known allergies. History reviewed. No pertinent past medical history. History reviewed. No pertinent surgical history. History reviewed. No pertinent family history. Social History     Socioeconomic History    Marital status:      Spouse name: Not on file    Number of children: Not on file    Years of education: Not on file    Highest education level: Not on file   Occupational History    Not on file   Tobacco Use    Smoking status: Never    Smokeless tobacco: Never   Substance and Sexual Activity    Alcohol use:  Yes     Alcohol/week: 0.0 standard drinks of alcohol    Drug use: No    Sexual activity: Not on file   Other Topics Concern    Not on file   Social History Narrative    Not on file     Social Determinants of Health     Financial Resource Strain: Low Risk  (12/20/2021)    Overall Financial Resource Strain (CARDIA)     Difficulty of Paying Living Expenses: Not hard at all   Food Insecurity: No Food Insecurity (12/20/2021)    Hunger Vital Sign     Worried About Running Out of Food in the Last Year: Never true     Ran Out of Food in the Last Year: Never true   Transportation Needs:

## 2023-11-29 ENCOUNTER — TELEPHONE (OUTPATIENT)
Dept: PAIN MANAGEMENT | Age: 43
End: 2023-11-29

## 2023-11-29 NOTE — TELEPHONE ENCOUNTER
Telephoned patient but he states that he would like the butt one done first because that's where his main pain is at. I gave the order to the precert dept to work on. Dr. Dario Villa has seen him so I was going to schedule him with Dr. Dario Villa.

## 2023-11-29 NOTE — TELEPHONE ENCOUNTER
R BICEP INJECTION. OK to schedule procedure approved as above. Please note sides/levels approved and date range.    (If applicable, sides/levels approved may differ from those ordered)      CAN SCHEDULE WITH ANY PROVIDER

## 2023-11-30 NOTE — TELEPHONE ENCOUNTER
PIRIFORMIS INJECTION    NO AUTH REQUIRED    OK TO CONTACT PATIENT TO SCHEDULE I was present during the key portion of the procedure.

## 2023-12-07 ENCOUNTER — TELEPHONE (OUTPATIENT)
Dept: PAIN MANAGEMENT | Age: 43
End: 2023-12-07

## 2023-12-07 NOTE — TELEPHONE ENCOUNTER
RIGHT BICEP TENDON INJ    NO AUTH REQUIRED    OK to schedule procedure approved as above.   Please note sides/levels approved and date range.   (If applicable, sides/levels approved may differ from those ordered)    TO BE SCHEDULED WITH DR GREENWOOD

## 2023-12-07 NOTE — TELEPHONE ENCOUNTER
(SK- RIGHT BICEP TENDON INJ -- NO AUTH REQUIRED)     Left message for the patient to please contact the office to schedule.

## 2023-12-11 ENCOUNTER — PROCEDURE VISIT (OUTPATIENT)
Dept: PAIN MANAGEMENT | Age: 43
End: 2023-12-11
Payer: COMMERCIAL

## 2023-12-11 DIAGNOSIS — M47.817 LUMBOSACRAL SPONDYLOSIS WITHOUT MYELOPATHY: ICD-10-CM

## 2023-12-11 DIAGNOSIS — G57.01 PIRIFORMIS SYNDROME OF RIGHT SIDE: Primary | ICD-10-CM

## 2023-12-11 PROCEDURE — 20552 NJX 1/MLT TRIGGER POINT 1/2: CPT | Performed by: PAIN MEDICINE

## 2023-12-11 PROCEDURE — 77002 NEEDLE LOCALIZATION BY XRAY: CPT | Performed by: PAIN MEDICINE

## 2023-12-11 RX ORDER — BETAMETHASONE SODIUM PHOSPHATE AND BETAMETHASONE ACETATE 3; 3 MG/ML; MG/ML
6 INJECTION, SUSPENSION INTRA-ARTICULAR; INTRALESIONAL; INTRAMUSCULAR; SOFT TISSUE ONCE
Status: COMPLETED | OUTPATIENT
Start: 2023-12-11 | End: 2023-12-11

## 2023-12-11 RX ORDER — LIDOCAINE HYDROCHLORIDE 10 MG/ML
3 INJECTION, SOLUTION EPIDURAL; INFILTRATION; INTRACAUDAL; PERINEURAL ONCE
Status: COMPLETED | OUTPATIENT
Start: 2023-12-11 | End: 2023-12-11

## 2023-12-11 RX ADMIN — LIDOCAINE HYDROCHLORIDE 3 ML: 10 INJECTION, SOLUTION EPIDURAL; INFILTRATION; INTRACAUDAL; PERINEURAL at 16:26

## 2023-12-11 RX ADMIN — BETAMETHASONE SODIUM PHOSPHATE AND BETAMETHASONE ACETATE 6 MG: 3; 3 INJECTION, SUSPENSION INTRA-ARTICULAR; INTRALESIONAL; INTRAMUSCULAR; SOFT TISSUE at 16:27

## 2023-12-11 NOTE — PROGRESS NOTES
Patient had a right piriformis injection done today under fluoroscopic guidance. I landmarks were identified areas cleaned Betadine alcohol 26-gauge 3 and half inch spinal needle was used. Using fluoroscopic guidance including lateral and AP views and was advanced to piriformis muscle without difficulty. Patient approximately 6 mg Celestone with 2 cc of 1% preservative lidocaine was done without difficulty. Patient tolerated procedure well no complication during procedure. Take it easy use ice. He also has right-sided low back pain history of heart ablation which helped over 50%. Will put in for right L3-4-5 RF ablation. SLR negative. Tender along R lumber facet joints with pain and decreased ROM. Extension and rotation with muscle spasms. Spoke with pt who stated she would like to start taking Zoloft.   Pt states she'd never taken Zoloft but her  does.    Pt advised she should make an appt to see PCP.    Pt scheduled to see PCP tomorrow.

## 2023-12-12 ENCOUNTER — TELEPHONE (OUTPATIENT)
Dept: PAIN MANAGEMENT | Age: 43
End: 2023-12-12

## 2023-12-12 NOTE — TELEPHONE ENCOUNTER
REFERRAL # [id number]    RIGHT L345 RFA    AUTH FROM 12/18/23-1/16/24    OK to schedule procedure approved as above. Please note sides/levels approved and date range.    (If applicable, sides/levels approved may differ from those ordered)    TO BE SCHEDULED WITH DR Stefano Goins

## 2023-12-26 ENCOUNTER — OFFICE VISIT (OUTPATIENT)
Dept: PAIN MANAGEMENT | Age: 43
End: 2023-12-26
Payer: COMMERCIAL

## 2023-12-26 DIAGNOSIS — M47.817 LUMBOSACRAL SPONDYLOSIS WITHOUT MYELOPATHY: ICD-10-CM

## 2023-12-26 PROCEDURE — 64635 DESTROY LUMB/SAC FACET JNT: CPT | Performed by: PAIN MEDICINE

## 2023-12-26 PROCEDURE — 64636 DESTROY L/S FACET JNT ADDL: CPT | Performed by: PAIN MEDICINE

## 2023-12-26 RX ORDER — BETAMETHASONE SODIUM PHOSPHATE AND BETAMETHASONE ACETATE 3; 3 MG/ML; MG/ML
6 INJECTION, SUSPENSION INTRA-ARTICULAR; INTRALESIONAL; INTRAMUSCULAR; SOFT TISSUE ONCE
Status: COMPLETED | OUTPATIENT
Start: 2023-12-26 | End: 2023-12-26

## 2023-12-26 RX ORDER — LIDOCAINE HYDROCHLORIDE 10 MG/ML
3 INJECTION, SOLUTION EPIDURAL; INFILTRATION; INTRACAUDAL; PERINEURAL ONCE
Status: COMPLETED | OUTPATIENT
Start: 2023-12-26 | End: 2023-12-26

## 2023-12-26 RX ADMIN — LIDOCAINE HYDROCHLORIDE 3 ML: 10 INJECTION, SOLUTION EPIDURAL; INFILTRATION; INTRACAUDAL; PERINEURAL at 15:44

## 2023-12-26 RX ADMIN — BETAMETHASONE SODIUM PHOSPHATE AND BETAMETHASONE ACETATE 6 MG: 3; 3 INJECTION, SUSPENSION INTRA-ARTICULAR; INTRALESIONAL; INTRAMUSCULAR; SOFT TISSUE at 15:44

## 2024-01-18 ENCOUNTER — OFFICE VISIT (OUTPATIENT)
Dept: PAIN MANAGEMENT | Age: 44
End: 2024-01-18
Payer: COMMERCIAL

## 2024-01-18 VITALS
WEIGHT: 230 LBS | DIASTOLIC BLOOD PRESSURE: 84 MMHG | HEIGHT: 69 IN | BODY MASS INDEX: 34.07 KG/M2 | SYSTOLIC BLOOD PRESSURE: 108 MMHG

## 2024-01-18 DIAGNOSIS — Z79.891 ENCOUNTER FOR LONG-TERM OPIATE ANALGESIC USE: ICD-10-CM

## 2024-01-18 DIAGNOSIS — M47.817 LUMBOSACRAL SPONDYLOSIS WITHOUT MYELOPATHY: ICD-10-CM

## 2024-01-18 DIAGNOSIS — M46.1 SACROILIITIS, NOT ELSEWHERE CLASSIFIED (HCC): ICD-10-CM

## 2024-01-18 DIAGNOSIS — M70.71 ISCHIAL BURSITIS OF RIGHT SIDE: ICD-10-CM

## 2024-01-18 DIAGNOSIS — G89.4 CHRONIC PAIN SYNDROME: ICD-10-CM

## 2024-01-18 DIAGNOSIS — M54.16 LUMBAR RADICULOPATHY: ICD-10-CM

## 2024-01-18 PROCEDURE — 99214 OFFICE O/P EST MOD 30 MIN: CPT | Performed by: NURSE PRACTITIONER

## 2024-01-18 RX ORDER — PHENTERMINE HYDROCHLORIDE 37.5 MG/1
TABLET ORAL
COMMUNITY
Start: 2024-01-16

## 2024-01-18 RX ORDER — TRAMADOL HYDROCHLORIDE 50 MG/1
50 TABLET ORAL SEE ADMIN INSTRUCTIONS
Qty: 40 TABLET | Refills: 1 | Status: SHIPPED | OUTPATIENT
Start: 2024-01-18 | End: 2024-03-18

## 2024-01-18 RX ORDER — PREGABALIN 75 MG/1
75 CAPSULE ORAL 3 TIMES DAILY
Qty: 90 CAPSULE | Refills: 0 | Status: SHIPPED | OUTPATIENT
Start: 2024-01-18 | End: 2024-02-17

## 2024-01-18 RX ORDER — METHYLPREDNISOLONE 4 MG/1
TABLET ORAL
Qty: 1 KIT | Refills: 0 | Status: SHIPPED | OUTPATIENT
Start: 2024-01-18 | End: 2024-01-24

## 2024-01-18 ASSESSMENT — ENCOUNTER SYMPTOMS
SHORTNESS OF BREATH: 0
BLOOD IN STOOL: 0
BACK PAIN: 1

## 2024-01-18 NOTE — PROGRESS NOTES
Vasquez Alexander  (1980)    1/18/2024    Subjective:     Vasquez Alexander is 43 y.o. male who complains today of:    Chief Complaint   Patient presents with    Leg Pain     Right          Allergies:  Patient has no known allergies.    History reviewed. No pertinent past medical history.  History reviewed. No pertinent surgical history.  History reviewed. No pertinent family history.  Social History     Socioeconomic History    Marital status:      Spouse name: Not on file    Number of children: Not on file    Years of education: Not on file    Highest education level: Not on file   Occupational History    Not on file   Tobacco Use    Smoking status: Never    Smokeless tobacco: Never   Substance and Sexual Activity    Alcohol use: Yes     Alcohol/week: 0.0 standard drinks of alcohol    Drug use: No    Sexual activity: Not on file   Other Topics Concern    Not on file   Social History Narrative    Not on file     Social Determinants of Health     Financial Resource Strain: Low Risk  (12/20/2021)    Overall Financial Resource Strain (CARDIA)     Difficulty of Paying Living Expenses: Not hard at all   Food Insecurity: No Food Insecurity (12/20/2021)    Hunger Vital Sign     Worried About Running Out of Food in the Last Year: Never true     Ran Out of Food in the Last Year: Never true   Transportation Needs: Not on file   Physical Activity: Not on file   Stress: Not on file   Social Connections: Not on file   Intimate Partner Violence: Not on file   Housing Stability: Not on file       Current Outpatient Medications on File Prior to Visit   Medication Sig Dispense Refill    phentermine (ADIPEX-P) 37.5 MG tablet take 1 tablet by mouth once daily maximum daily dose of 1       No current facility-administered medications on file prior to visit.           Pt presents today for a f/u chronic low back and right leg pain.  He is here today with flare of RLE pain and numbness, worse in the posterior thigh. Also

## 2024-01-19 DIAGNOSIS — M47.817 LUMBOSACRAL SPONDYLOSIS WITHOUT MYELOPATHY: ICD-10-CM

## 2024-01-19 DIAGNOSIS — Z79.891 ENCOUNTER FOR LONG-TERM OPIATE ANALGESIC USE: ICD-10-CM

## 2024-01-19 DIAGNOSIS — M54.16 LUMBAR RADICULOPATHY: ICD-10-CM

## 2024-01-19 DIAGNOSIS — G89.4 CHRONIC PAIN SYNDROME: ICD-10-CM

## 2024-01-19 DIAGNOSIS — M46.1 SACROILIITIS, NOT ELSEWHERE CLASSIFIED (HCC): ICD-10-CM

## 2024-01-19 RX ORDER — TRAMADOL HYDROCHLORIDE 50 MG/1
50 TABLET ORAL SEE ADMIN INSTRUCTIONS
Qty: 40 TABLET | Refills: 1 | OUTPATIENT
Start: 2024-01-19 | End: 2024-03-19

## 2024-01-22 ENCOUNTER — TELEPHONE (OUTPATIENT)
Dept: PAIN MANAGEMENT | Age: 44
End: 2024-01-22

## 2024-01-24 ENCOUNTER — TELEPHONE (OUTPATIENT)
Dept: PAIN MANAGEMENT | Age: 44
End: 2024-01-24

## 2024-01-25 NOTE — TELEPHONE ENCOUNTER
Tramadol (Ultram) 50 mg tablet prior authorization request submitted online (CoverMyMeds.com to [employer]), clinical uploaded, auth pending.    (Key: [id number])  PA Case ID #: [id number]

## 2024-01-26 NOTE — TELEPHONE ENCOUNTER
Per [email or web address], Tramadol (Ultram) 50 mg tablet approved.  Called patient and informed him.  He will contact pharmacy to let them know they can fill the prescription.     Approved on January 25  The case has been Approved from to (no dates provided on CoverMyMeds.com).

## 2024-02-06 ENCOUNTER — OFFICE VISIT (OUTPATIENT)
Dept: PAIN MANAGEMENT | Age: 44
End: 2024-02-06
Payer: COMMERCIAL

## 2024-02-06 VITALS
SYSTOLIC BLOOD PRESSURE: 132 MMHG | TEMPERATURE: 98 F | HEIGHT: 69 IN | DIASTOLIC BLOOD PRESSURE: 84 MMHG | BODY MASS INDEX: 32.58 KG/M2 | WEIGHT: 220 LBS

## 2024-02-06 DIAGNOSIS — M46.1 SACROILIITIS, NOT ELSEWHERE CLASSIFIED (HCC): ICD-10-CM

## 2024-02-06 DIAGNOSIS — M54.16 LUMBAR RADICULOPATHY: Primary | ICD-10-CM

## 2024-02-06 DIAGNOSIS — M47.817 LUMBOSACRAL SPONDYLOSIS WITHOUT MYELOPATHY: ICD-10-CM

## 2024-02-06 DIAGNOSIS — G89.4 CHRONIC PAIN SYNDROME: ICD-10-CM

## 2024-02-06 DIAGNOSIS — R53.1 WEAKNESS: ICD-10-CM

## 2024-02-06 DIAGNOSIS — Z79.891 ENCOUNTER FOR LONG-TERM OPIATE ANALGESIC USE: ICD-10-CM

## 2024-02-06 PROCEDURE — 99214 OFFICE O/P EST MOD 30 MIN: CPT | Performed by: NURSE PRACTITIONER

## 2024-02-06 RX ORDER — PREGABALIN 100 MG/1
100 CAPSULE ORAL 3 TIMES DAILY
Qty: 90 CAPSULE | Refills: 0 | Status: SHIPPED | OUTPATIENT
Start: 2024-02-06 | End: 2024-03-07

## 2024-02-06 RX ORDER — TRAMADOL HYDROCHLORIDE 50 MG/1
50 TABLET ORAL SEE ADMIN INSTRUCTIONS
Qty: 40 TABLET | Refills: 0 | Status: SHIPPED | OUTPATIENT
Start: 2024-02-06 | End: 2024-03-07

## 2024-02-06 RX ORDER — PREGABALIN 75 MG/1
75 CAPSULE ORAL 3 TIMES DAILY
Qty: 90 CAPSULE | Refills: 0 | Status: CANCELLED | OUTPATIENT
Start: 2024-02-06 | End: 2024-03-07

## 2024-02-06 ASSESSMENT — ENCOUNTER SYMPTOMS
SHORTNESS OF BREATH: 0
EYES NEGATIVE: 1
COUGH: 0
BACK PAIN: 1
CONSTIPATION: 0
NAUSEA: 0
DIARRHEA: 0
GASTROINTESTINAL NEGATIVE: 1

## 2024-02-06 NOTE — PROGRESS NOTES
Vasquez Alexander  (1980)    2/6/2024    Subjective:     Vasquez Alexander is 43 y.o. male who complains today of:    Chief Complaint   Patient presents with    Follow-up    Back Pain    Leg Pain         Allergies:  Patient has no known allergies.    History reviewed. No pertinent past medical history.  History reviewed. No pertinent surgical history.  History reviewed. No pertinent family history.  Social History     Socioeconomic History    Marital status:      Spouse name: Not on file    Number of children: Not on file    Years of education: Not on file    Highest education level: Not on file   Occupational History    Not on file   Tobacco Use    Smoking status: Never    Smokeless tobacco: Never   Substance and Sexual Activity    Alcohol use: Yes     Alcohol/week: 0.0 standard drinks of alcohol    Drug use: No    Sexual activity: Not on file   Other Topics Concern    Not on file   Social History Narrative    Not on file     Social Determinants of Health     Financial Resource Strain: Low Risk  (12/20/2021)    Overall Financial Resource Strain (CARDIA)     Difficulty of Paying Living Expenses: Not hard at all   Food Insecurity: No Food Insecurity (12/20/2021)    Hunger Vital Sign     Worried About Running Out of Food in the Last Year: Never true     Ran Out of Food in the Last Year: Never true   Transportation Needs: Not on file   Physical Activity: Not on file   Stress: Not on file   Social Connections: Not on file   Intimate Partner Violence: Not on file   Housing Stability: Not on file       Current Outpatient Medications on File Prior to Visit   Medication Sig Dispense Refill    phentermine (ADIPEX-P) 37.5 MG tablet take 1 tablet by mouth once daily maximum daily dose of 1       No current facility-administered medications on file prior to visit.           Pt presents today for a f/u of his pain.  PCP is Dr. Vargas. Patient last saw Marychuy Corrales CNP for his chronic low back pain and right lower

## 2024-02-15 ENCOUNTER — HOSPITAL ENCOUNTER (OUTPATIENT)
Dept: MRI IMAGING | Age: 44
Discharge: HOME | End: 2024-02-17
Payer: COMMERCIAL

## 2024-02-15 DIAGNOSIS — R53.1 WEAKNESS: ICD-10-CM

## 2024-02-15 DIAGNOSIS — M54.16 LUMBAR RADICULOPATHY: ICD-10-CM

## 2024-02-15 PROCEDURE — 72158 MRI LUMBAR SPINE W/O & W/DYE: CPT

## 2024-02-15 PROCEDURE — A9579 GAD-BASE MR CONTRAST NOS,1ML: HCPCS | Performed by: NURSE PRACTITIONER

## 2024-02-15 PROCEDURE — 6360000004 HC RX CONTRAST MEDICATION: Performed by: NURSE PRACTITIONER

## 2024-02-15 RX ADMIN — GADOTERIDOL 20 ML: 279.3 INJECTION, SOLUTION INTRAVENOUS at 17:53

## 2024-02-19 ENCOUNTER — TELEPHONE (OUTPATIENT)
Dept: PAIN MANAGEMENT | Age: 44
End: 2024-02-19

## 2024-02-19 NOTE — TELEPHONE ENCOUNTER
Discussed results with patient. Patient is unsure of what to do and would like to speak with Carlee sooner. I rescheduled him for an earlier date to discuss.

## 2024-02-19 NOTE — TELEPHONE ENCOUNTER
----- Message from LOW Coreas CNP sent at 2/19/2024  7:59 AM EST -----  Lumbar MRI report reviewed from 2/15/2024.  No compression fracture noted. Moderate to severe L5-S1 degenerative disc disease is noted and has progressed from prior study compared to 1/6/2023 report.  Noted disc extrusion impinging transversing right S1 nerve root.  Prior L5-S1 left hemilaminectomy.    MA, please advise patient MRI report shows arthritis changes at L5-S1 as well as a disc extrusion impinging on the nerve on the right.  Will discuss in detail at follow-up.  Recommend right L5-S1 transforaminal epidural steroid injection versus consultation with neurosurgery.  Would patient like order placed for epidural?

## 2024-02-22 ENCOUNTER — TELEPHONE (OUTPATIENT)
Dept: PAIN MANAGEMENT | Age: 44
End: 2024-02-22

## 2024-02-22 ENCOUNTER — OFFICE VISIT (OUTPATIENT)
Dept: PAIN MANAGEMENT | Age: 44
End: 2024-02-22
Payer: COMMERCIAL

## 2024-02-22 VITALS
HEIGHT: 69 IN | BODY MASS INDEX: 33.33 KG/M2 | WEIGHT: 225 LBS | DIASTOLIC BLOOD PRESSURE: 76 MMHG | TEMPERATURE: 98 F | SYSTOLIC BLOOD PRESSURE: 114 MMHG

## 2024-02-22 DIAGNOSIS — R53.1 WEAKNESS: ICD-10-CM

## 2024-02-22 DIAGNOSIS — M54.17 LUMBOSACRAL RADICULOPATHY: Primary | ICD-10-CM

## 2024-02-22 DIAGNOSIS — M51.27 LUMBOSACRAL DISC HERNIATION: ICD-10-CM

## 2024-02-22 PROCEDURE — 99214 OFFICE O/P EST MOD 30 MIN: CPT | Performed by: NURSE PRACTITIONER

## 2024-02-22 RX ORDER — PREGABALIN 100 MG/1
100 CAPSULE ORAL 3 TIMES DAILY
Qty: 90 CAPSULE | Refills: 0 | Status: SHIPPED | OUTPATIENT
Start: 2024-02-22 | End: 2024-03-23

## 2024-02-22 ASSESSMENT — ENCOUNTER SYMPTOMS
BACK PAIN: 1
GASTROINTESTINAL NEGATIVE: 1
DIARRHEA: 0
SHORTNESS OF BREATH: 0
COUGH: 0
CONSTIPATION: 0
NAUSEA: 0
EYES NEGATIVE: 1

## 2024-02-22 NOTE — TELEPHONE ENCOUNTER
REFERRAL # [id number]    RIGHT L5-S1 MARK    AUTH FROM 3/5/24-4/3/24    OK to schedule procedure approved as above.   Please note sides/levels approved and date range.   (If applicable, sides/levels approved may differ from those ordered)    TO BE SCHEDULED WITH DR GREENWOOD

## 2024-02-22 NOTE — PROGRESS NOTES
Patient Name: Vasquez Alexander : 1980        Date: 2024      Type of Appt: Consult    Reason for appt: please eval and Tx - consult: lumbar MRI report shows severe DDD L5-S1,disc extrusion impinging transversing right S1 nerve root, previous surgery.     Referred by: Carlee Rendon CNP    Studies done: 02/15/2024 - MRI LUMBAR SPINE W WO CONTRAST @ [facility]    Smoking: No                        [facility]  [facility]surgery [facility]  [address] , [address]  [place], [place]  P: [phone]  F: [phone]          Patient:  Vasquez Alexander  YOB: 1980  Date: 2024    The patient is a 43 y.o. male who presents today for evaluation of the following problems:     Chief Complaint   Patient presents with    Back Pain        Referred by Carlee Rendon APRN *      PAST MEDICAL, FAMILY AND SOCIAL HISTORY:  History reviewed. No pertinent past medical history.  No past surgical history on file.  No family history on file.  Current Outpatient Medications on File Prior to Visit   Medication Sig Dispense Refill    pregabalin (LYRICA) 100 MG capsule Take 1 capsule by mouth 3 times daily for 30 days. Max Daily Amount: 300 mg 90 capsule 0    traMADol (ULTRAM) 50 MG tablet Take 1 tablet by mouth See Admin Instructions for 30 days. Take 1-2 tabs daily prn pain. #40 tabs for 30 days. 40 tablet 0    phentermine (ADIPEX-P) 37.5 MG tablet take 1 tablet by mouth once daily maximum daily dose of 1       No current facility-administered medications on file prior to visit.     Social History     Tobacco Use    Smoking status: Never    Smokeless tobacco: Never   Substance Use Topics    Alcohol use: Yes     Alcohol/week: 0.0 standard drinks of alcohol    Drug use: No       ALLERGIES  No Known Allergies      REVIEW OF SYSTEMS:  Review of Systems   Constitutional:  Negative for fever.   HENT:  Negative for hearing loss.    Eyes:  Negative for pain.   Respiratory:  Negative for

## 2024-02-27 ENCOUNTER — INITIAL CONSULT (OUTPATIENT)
Dept: NEUROSURGERY | Age: 44
End: 2024-02-27
Payer: COMMERCIAL

## 2024-02-27 VITALS
BODY MASS INDEX: 33.33 KG/M2 | HEIGHT: 69 IN | TEMPERATURE: 97.6 F | WEIGHT: 225 LBS | DIASTOLIC BLOOD PRESSURE: 82 MMHG | SYSTOLIC BLOOD PRESSURE: 112 MMHG

## 2024-02-27 DIAGNOSIS — M51.27 HERNIATED NUCLEUS PULPOSUS, L5-S1, RIGHT: Primary | ICD-10-CM

## 2024-02-27 PROCEDURE — 99203 OFFICE O/P NEW LOW 30 MIN: CPT | Performed by: NEUROLOGICAL SURGERY

## 2024-02-27 RX ORDER — SODIUM CHLORIDE 0.9 % (FLUSH) 0.9 %
5-40 SYRINGE (ML) INJECTION PRN
OUTPATIENT
Start: 2024-02-27

## 2024-02-27 RX ORDER — SODIUM CHLORIDE 0.9 % (FLUSH) 0.9 %
5-40 SYRINGE (ML) INJECTION EVERY 12 HOURS SCHEDULED
OUTPATIENT
Start: 2024-02-27

## 2024-02-27 RX ORDER — ACETAMINOPHEN 325 MG/1
1000 TABLET ORAL ONCE
OUTPATIENT
Start: 2024-02-27 | End: 2024-02-27

## 2024-02-27 RX ORDER — SODIUM CHLORIDE 9 MG/ML
INJECTION, SOLUTION INTRAVENOUS PRN
OUTPATIENT
Start: 2024-02-27

## 2024-02-27 RX ORDER — KETOROLAC TROMETHAMINE 30 MG/ML
30 INJECTION, SOLUTION INTRAMUSCULAR; INTRAVENOUS ONCE
OUTPATIENT
Start: 2024-02-27 | End: 2024-02-27

## 2024-02-27 ASSESSMENT — ENCOUNTER SYMPTOMS
SHORTNESS OF BREATH: 0
NAUSEA: 0
EYE PAIN: 0
BACK PAIN: 1

## 2024-03-05 ENCOUNTER — PREP FOR PROCEDURE (OUTPATIENT)
Dept: NEUROSURGERY | Age: 44
End: 2024-03-05

## 2024-03-05 DIAGNOSIS — M51.27 OTHER INTERVERTEBRAL DISC DISPLACEMENT, LUMBOSACRAL REGION: ICD-10-CM

## 2024-03-07 ENCOUNTER — PROCEDURE VISIT (OUTPATIENT)
Dept: PAIN MANAGEMENT | Age: 44
End: 2024-03-07

## 2024-03-07 VITALS
HEIGHT: 69 IN | BODY MASS INDEX: 33.33 KG/M2 | SYSTOLIC BLOOD PRESSURE: 132 MMHG | WEIGHT: 225 LBS | DIASTOLIC BLOOD PRESSURE: 86 MMHG

## 2024-03-07 DIAGNOSIS — G89.4 CHRONIC PAIN SYNDROME: Primary | ICD-10-CM

## 2024-03-07 DIAGNOSIS — R53.1 WEAKNESS: ICD-10-CM

## 2024-03-07 DIAGNOSIS — M46.1 SACROILIITIS, NOT ELSEWHERE CLASSIFIED (HCC): ICD-10-CM

## 2024-03-07 DIAGNOSIS — M47.817 LUMBOSACRAL SPONDYLOSIS WITHOUT MYELOPATHY: ICD-10-CM

## 2024-03-07 DIAGNOSIS — M54.17 LUMBOSACRAL RADICULOPATHY: ICD-10-CM

## 2024-03-07 DIAGNOSIS — Z79.891 ENCOUNTER FOR LONG-TERM OPIATE ANALGESIC USE: ICD-10-CM

## 2024-03-07 DIAGNOSIS — M54.16 LUMBAR RADICULOPATHY: ICD-10-CM

## 2024-03-07 RX ORDER — TRAMADOL HYDROCHLORIDE 50 MG/1
50 TABLET ORAL 2 TIMES DAILY
Qty: 60 TABLET | Refills: 0 | Status: SHIPPED | OUTPATIENT
Start: 2024-03-07 | End: 2024-04-06

## 2024-03-07 RX ORDER — DEXAMETHASONE SODIUM PHOSPHATE 10 MG/ML
10 INJECTION, SOLUTION INTRAMUSCULAR; INTRAVENOUS ONCE
Status: COMPLETED | OUTPATIENT
Start: 2024-03-07 | End: 2024-03-07

## 2024-03-07 RX ORDER — PREGABALIN 100 MG/1
100 CAPSULE ORAL 3 TIMES DAILY
Qty: 90 CAPSULE | Refills: 0 | Status: SHIPPED | OUTPATIENT
Start: 2024-03-07 | End: 2024-04-06

## 2024-03-07 RX ADMIN — DEXAMETHASONE SODIUM PHOSPHATE 10 MG: 10 INJECTION, SOLUTION INTRAMUSCULAR; INTRAVENOUS at 08:32

## 2024-03-07 NOTE — PROGRESS NOTES
"[facility] [place].  Spine Surgery  Advanced Pain Management      Patient Name: Vasquez Alexander : 1980  Date: 3/7/2024   Physician: LETICIA GREENWOOD DO      Vasquez Alexander  is here today for interventional pain management.    Preoperatively, the patient presents with radicular pain in the affected area as per history and exam. Patient has failed NSAIDs, PT, and conservative treatment. Patient has significant psychological and functional impairment due to this condition.  Standard ASIPP guidelines were followed and sterile technique used.  Area was cleaned with Betadine x3.  Informed consent was obtained.  Fluoroscopic guidance was used for this procedure.    TRANSFORAMINAL EPIDURAL  There was appropriate spread of contrast in the anterior epidural space and around the exiting nerve root. The 6 o’clock position of the pedicle was identified. Multiple views of fluoroscopy including lateral were used to confirm accurate needle placement of depth. Live fluoroscopy was used when injecting contrast to ensure no subdural or vascular spread. In total, approximately 5 mg of Dexamethasone and 1.0 ml of 0.9cc of normal saline was injected slowly without difficulty.    Patient tolerated the procedure well, no obvious complications occurred during the procedure.  Patient was appropriately monitored and discharged home in stable condition with their usual motor strength. Post Op instructions were given to patient. Patient will resume blood thinners after procedure if they stopped them before procedure. Relevant imaging was reviewed with patient.           [] Bilateral [] T12-L1 [] L3-4        [] L1-2 [] L4-5       [x] Right [] L2-3 [x] L5-S1                [] Left                  LETICIA GREENWOOD DO      [address] [facility], [address] [place], [place] [address]  Phone [phone]/Fax [phone]

## 2024-03-07 NOTE — PROGRESS NOTES
[facility] Physicians  Neurosurgery and Pain Management Center  [address], [address]  [place], [place]  P: [phone]  F: [phone]        Vasquez Alexander  (1980)    3/7/2024    Subjective:     Vasquez Alexander is 43 y.o. male who complains today of:     Chief Complaint   Patient presents with    Back Pain     RIGHT     Patient here today for follow-up.  He has some low back pain pain down the right leg with numbness tingling.  He had a recent MRI which shows significant disc herniation at L5-S1 on the right.  He will be having surgery in about 3 weeks with Dr. lPunkett in.  He is on Ultram and Lyrica.  He works.  No side effects or aberrant behavior.  Pain is worse with sitting standing walking no great position.    Plan: OARRS is reviewed narcotic requested.  Increased Ultram to twice a day.    Fill when due for left surgery about 3 weeks with Dr. Plunkett and separate epidural injection was done today.  Questions answered chart was reviewed.  Patient understands plan and is in agreement.  Allergies:  Patient has no known allergies.    No past medical history on file.  No past surgical history on file.  No family history on file.  Social History     Socioeconomic History    Marital status:      Spouse name: Not on file    Number of children: Not on file    Years of education: Not on file    Highest education level: Not on file   Occupational History    Not on file   Tobacco Use    Smoking status: Never    Smokeless tobacco: Never   Substance and Sexual Activity    Alcohol use: Yes     Alcohol/week: 0.0 standard drinks of alcohol    Drug use: No    Sexual activity: Not on file   Other Topics Concern    Not on file   Social History Narrative    Not on file     Social Determinants of Health     Financial Resource Strain: Low Risk  (12/20/2021)    Overall Financial Resource Strain (CARDIA)     Difficulty of Paying Living Expenses: Not hard at all   Food Insecurity: No Food

## 2024-04-15 ENCOUNTER — HOSPITAL ENCOUNTER (OUTPATIENT)
Dept: PREADMISSION TESTING | Age: 44
Discharge: HOME | End: 2024-04-19
Payer: COMMERCIAL

## 2024-04-15 VITALS
DIASTOLIC BLOOD PRESSURE: 77 MMHG | HEIGHT: 69 IN | WEIGHT: 234 LBS | SYSTOLIC BLOOD PRESSURE: 133 MMHG | RESPIRATION RATE: 15 BRPM | TEMPERATURE: 98.5 F | BODY MASS INDEX: 34.66 KG/M2 | OXYGEN SATURATION: 99 % | HEART RATE: 78 BPM

## 2024-04-15 LAB
ABO + RH BLD: NORMAL
BLD GP AB SCN SERPL QL: NORMAL
ERYTHROCYTE [DISTWIDTH] IN BLOOD BY AUTOMATED COUNT: 12 % (ref 11.5–14.5)
HCT VFR BLD AUTO: 44.3 % (ref 42–52)
HGB BLD-MCNC: 15.1 G/DL (ref 14–18)
MCH RBC QN AUTO: 30.1 PG (ref 27–31.3)
MCHC RBC AUTO-ENTMCNC: 34.1 % (ref 33–37)
MCV RBC AUTO: 88.2 FL (ref 79–92.2)
PLATELET # BLD AUTO: 228 K/UL (ref 130–400)
RBC # BLD AUTO: 5.02 M/UL (ref 4.7–6.1)
WBC # BLD AUTO: 9.8 K/UL (ref 4.8–10.8)

## 2024-04-15 PROCEDURE — 86901 BLOOD TYPING SEROLOGIC RH(D): CPT

## 2024-04-15 PROCEDURE — 86900 BLOOD TYPING SEROLOGIC ABO: CPT

## 2024-04-15 PROCEDURE — 87641 MR-STAPH DNA AMP PROBE: CPT

## 2024-04-15 PROCEDURE — 85027 COMPLETE CBC AUTOMATED: CPT

## 2024-04-15 PROCEDURE — 86850 RBC ANTIBODY SCREEN: CPT

## 2024-04-15 RX ORDER — TRAMADOL HYDROCHLORIDE 50 MG/1
50 TABLET ORAL EVERY 6 HOURS PRN
COMMUNITY

## 2024-04-15 ASSESSMENT — ENCOUNTER SYMPTOMS
WHEEZING: 0
FACIAL SWELLING: 0
NAUSEA: 0
SINUS PAIN: 0
DIARRHEA: 0
EYE REDNESS: 0
EYE PAIN: 0
SHORTNESS OF BREATH: 0
COUGH: 0
TROUBLE SWALLOWING: 0
ABDOMINAL DISTENTION: 0
PHOTOPHOBIA: 0
EYE ITCHING: 0
CHEST TIGHTNESS: 0
SINUS PRESSURE: 0
ABDOMINAL PAIN: 0
EYE DISCHARGE: 0
APNEA: 0
CHOKING: 0
RHINORRHEA: 0
SORE THROAT: 0
VOMITING: 0
BACK PAIN: 1
CONSTIPATION: 0

## 2024-04-15 NOTE — H&P (VIEW-ONLY)
PRE ADMISSION TESTING    HISTORY AND PHYSICAL EXAM    PATIENT NAME:  Vasquez Alexander    YOB: 1980  MRN:  [id number]  SERVICE DATE:  4/15/2024     Primary Care Physician: Partha Vargas MD   Surgeon: Dr. Nery Vargas    SUBJECTIVE  CHIEF COMPLAINT:  Other intervertebral disc displacement, lumbosacral region     The patient is a 43 y.o. male with significant past medical history of SOHA, chronic pain syndrome who presents for a preoperative consultation at the request of surgeon, Dr. Nery Vargas, who plans on performing Right lumbar 5-Sacral 1 microdiscectomy on 4/22/24 at [facility].     Patient reports back pain that started shortly before 2013 which subsequently led to a lumbar surgery for a herniated disc on his left side.  He reports that the surgery was performed on the left side however his right low back started to become painful and slowly increased with time. Over the past several years, he has been a patient of pain management due to his chronic pain and has received injections and ablations which did provide good relief for some time however they seem to be losing there effectiveness.  In January 2024, he woke up with a \"charley horse\" type pain in the right calf and his right foot was numb.  He does not remember any specific reason why that occurred but the pain continued to progress and he was having trouble ambulating.  He was referred to Dr. Vargas by his pain management provider.  He had imaging completed and Dr. Vargas recommended surgical intervention and patient has elected to proceed.  Patient reports that he recently had an injection which has helped bring his pain down.  Normally, his pain has been 10/10 but the injection has made it tolerable for most days.  Pain does radiate down the right leg and he does have numbness in his toes on his right foot.  He feels as if the pain can be so painful down into his bone.  Patient reports that any alcohol use or any activity can increase the pain.

## 2024-04-15 NOTE — H&P
PRE ADMISSION TESTING    HISTORY AND PHYSICAL EXAM    PATIENT NAME:  Vasquez Alexander    YOB: 1980  MRN:  [id number]  SERVICE DATE:  4/15/2024     Primary Care Physician: Partha Vargas MD   Surgeon: Dr. Nery Vargas    SUBJECTIVE  CHIEF COMPLAINT:  Other intervertebral disc displacement, lumbosacral region     The patient is a 43 y.o. male with significant past medical history of SOHA, chronic pain syndrome who presents for a preoperative consultation at the request of surgeon, Dr. Nery Vargas, who plans on performing Right lumbar 5-Sacral 1 microdiscectomy on 4/22/24 at [facility].     Patient reports back pain that started shortly before 2013 which subsequently led to a lumbar surgery for a herniated disc on his left side.  He reports that the surgery was performed on the left side however his right low back started to become painful and slowly increased with time. Over the past several years, he has been a patient of pain management due to his chronic pain and has received injections and ablations which did provide good relief for some time however they seem to be losing there effectiveness.  In January 2024, he woke up with a \"charley horse\" type pain in the right calf and his right foot was numb.  He does not remember any specific reason why that occurred but the pain continued to progress and he was having trouble ambulating.  He was referred to Dr. Vargas by his pain management provider.  He had imaging completed and Dr. Vargas recommended surgical intervention and patient has elected to proceed.  Patient reports that he recently had an injection which has helped bring his pain down.  Normally, his pain has been 10/10 but the injection has made it tolerable for most days.  Pain does radiate down the right leg and he does have numbness in his toes on his right foot.  He feels as if the pain can be so painful down into his bone.  Patient reports that any alcohol use or any activity can increase the pain.

## 2024-04-16 LAB
MRSA, DNA, NASAL: NEGATIVE
SPECIMEN DESCRIPTION: NORMAL

## 2024-04-17 ENCOUNTER — TELEPHONE (OUTPATIENT)
Dept: NEUROSURGERY | Age: 44
End: 2024-04-17

## 2024-04-22 ENCOUNTER — APPOINTMENT (OUTPATIENT)
Dept: GENERAL RADIOLOGY | Age: 44
End: 2024-04-22
Attending: NEUROLOGICAL SURGERY
Payer: COMMERCIAL

## 2024-04-22 ENCOUNTER — HOSPITAL ENCOUNTER (OUTPATIENT)
Age: 44
Setting detail: OUTPATIENT SURGERY
Discharge: HOME | End: 2024-04-22
Attending: NEUROLOGICAL SURGERY | Admitting: NEUROLOGICAL SURGERY
Payer: COMMERCIAL

## 2024-04-22 ENCOUNTER — ANESTHESIA (OUTPATIENT)
Dept: OPERATING ROOM | Age: 44
End: 2024-04-22
Payer: COMMERCIAL

## 2024-04-22 ENCOUNTER — ANESTHESIA EVENT (OUTPATIENT)
Dept: OPERATING ROOM | Age: 44
End: 2024-04-22
Payer: COMMERCIAL

## 2024-04-22 VITALS
DIASTOLIC BLOOD PRESSURE: 80 MMHG | WEIGHT: 234 LBS | OXYGEN SATURATION: 96 % | TEMPERATURE: 99.5 F | RESPIRATION RATE: 16 BRPM | HEIGHT: 70 IN | HEART RATE: 107 BPM | BODY MASS INDEX: 33.5 KG/M2 | SYSTOLIC BLOOD PRESSURE: 137 MMHG

## 2024-04-22 DIAGNOSIS — M51.27 HERNIATED NUCLEUS PULPOSUS, L5-S1, RIGHT: Primary | ICD-10-CM

## 2024-04-22 PROCEDURE — 7100000001 HC PACU RECOVERY - ADDTL 15 MIN: Performed by: NEUROLOGICAL SURGERY

## 2024-04-22 PROCEDURE — 7100000011 HC PHASE II RECOVERY - ADDTL 15 MIN: Performed by: NEUROLOGICAL SURGERY

## 2024-04-22 PROCEDURE — 6360000002 HC RX W HCPCS: Performed by: ANESTHESIOLOGIST ASSISTANT

## 2024-04-22 PROCEDURE — 3600000004 HC SURGERY LEVEL 4 BASE: Performed by: NEUROLOGICAL SURGERY

## 2024-04-22 PROCEDURE — 2580000003 HC RX 258: Performed by: ANESTHESIOLOGIST ASSISTANT

## 2024-04-22 PROCEDURE — 3700000001 HC ADD 15 MINUTES (ANESTHESIA): Performed by: NEUROLOGICAL SURGERY

## 2024-04-22 PROCEDURE — 6360000002 HC RX W HCPCS: Performed by: NEUROLOGICAL SURGERY

## 2024-04-22 PROCEDURE — 63030 LAMOT DCMPRN NRV RT 1 LMBR: CPT | Performed by: NEUROLOGICAL SURGERY

## 2024-04-22 PROCEDURE — 6360000002 HC RX W HCPCS: Performed by: ANESTHESIOLOGY

## 2024-04-22 PROCEDURE — 6370000000 HC RX 637 (ALT 250 FOR IP): Performed by: NEUROLOGICAL SURGERY

## 2024-04-22 PROCEDURE — 2709999900 HC NON-CHARGEABLE SUPPLY: Performed by: NEUROLOGICAL SURGERY

## 2024-04-22 PROCEDURE — 7100000010 HC PHASE II RECOVERY - FIRST 15 MIN: Performed by: NEUROLOGICAL SURGERY

## 2024-04-22 PROCEDURE — 2580000003 HC RX 258: Performed by: NEUROLOGICAL SURGERY

## 2024-04-22 PROCEDURE — 2500000003 HC RX 250 WO HCPCS: Performed by: ANESTHESIOLOGIST ASSISTANT

## 2024-04-22 PROCEDURE — A4217 STERILE WATER/SALINE, 500 ML: HCPCS | Performed by: NEUROLOGICAL SURGERY

## 2024-04-22 PROCEDURE — 2500000003 HC RX 250 WO HCPCS: Performed by: ANESTHESIOLOGY

## 2024-04-22 PROCEDURE — C1713 ANCHOR/SCREW BN/BN,TIS/BN: HCPCS | Performed by: NEUROLOGICAL SURGERY

## 2024-04-22 PROCEDURE — 7100000000 HC PACU RECOVERY - FIRST 15 MIN: Performed by: NEUROLOGICAL SURGERY

## 2024-04-22 PROCEDURE — 3600000014 HC SURGERY LEVEL 4 ADDTL 15MIN: Performed by: NEUROLOGICAL SURGERY

## 2024-04-22 PROCEDURE — 3700000000 HC ANESTHESIA ATTENDED CARE: Performed by: NEUROLOGICAL SURGERY

## 2024-04-22 PROCEDURE — 2500000003 HC RX 250 WO HCPCS: Performed by: NEUROLOGICAL SURGERY

## 2024-04-22 PROCEDURE — 2720000010 HC SURG SUPPLY STERILE: Performed by: NEUROLOGICAL SURGERY

## 2024-04-22 PROCEDURE — 76942 ECHO GUIDE FOR BIOPSY: CPT | Performed by: ANESTHESIOLOGY

## 2024-04-22 RX ORDER — DEXAMETHASONE SODIUM PHOSPHATE 4 MG/ML
8 INJECTION, SOLUTION INTRA-ARTICULAR; INTRALESIONAL; INTRAMUSCULAR; INTRAVENOUS; SOFT TISSUE ONCE
Status: COMPLETED | OUTPATIENT
Start: 2024-04-22 | End: 2024-04-22

## 2024-04-22 RX ORDER — SODIUM CHLORIDE 9 MG/ML
INJECTION, SOLUTION INTRAVENOUS
Status: DISCONTINUED
Start: 2024-04-22 | End: 2024-04-22 | Stop reason: HOSPADM

## 2024-04-22 RX ORDER — MIDAZOLAM HYDROCHLORIDE 1 MG/ML
INJECTION INTRAMUSCULAR; INTRAVENOUS PRN
Status: DISCONTINUED | OUTPATIENT
Start: 2024-04-22 | End: 2024-04-22 | Stop reason: SDUPTHER

## 2024-04-22 RX ORDER — SODIUM CHLORIDE 9 MG/ML
INJECTION, SOLUTION INTRAVENOUS PRN
Status: DISCONTINUED | OUTPATIENT
Start: 2024-04-22 | End: 2024-04-22 | Stop reason: HOSPADM

## 2024-04-22 RX ORDER — HYDROCODONE BITARTRATE AND ACETAMINOPHEN 5; 325 MG/1; MG/1
1 TABLET ORAL EVERY 6 HOURS PRN
Qty: 28 TABLET | Refills: 0 | Status: SHIPPED | OUTPATIENT
Start: 2024-04-22 | End: 2024-04-29

## 2024-04-22 RX ORDER — LIDOCAINE HYDROCHLORIDE AND EPINEPHRINE 10; 10 MG/ML; UG/ML
INJECTION, SOLUTION INFILTRATION; PERINEURAL PRN
Status: DISCONTINUED | OUTPATIENT
Start: 2024-04-22 | End: 2024-04-22 | Stop reason: HOSPADM

## 2024-04-22 RX ORDER — SODIUM CHLORIDE 0.9 % (FLUSH) 0.9 %
5-40 SYRINGE (ML) INJECTION EVERY 12 HOURS SCHEDULED
Status: DISCONTINUED | OUTPATIENT
Start: 2024-04-22 | End: 2024-04-22 | Stop reason: HOSPADM

## 2024-04-22 RX ORDER — SODIUM CHLORIDE 0.9 % (FLUSH) 0.9 %
5-40 SYRINGE (ML) INJECTION PRN
Status: DISCONTINUED | OUTPATIENT
Start: 2024-04-22 | End: 2024-04-22 | Stop reason: HOSPADM

## 2024-04-22 RX ORDER — SODIUM CHLORIDE, SODIUM LACTATE, POTASSIUM CHLORIDE, CALCIUM CHLORIDE 600; 310; 30; 20 MG/100ML; MG/100ML; MG/100ML; MG/100ML
INJECTION, SOLUTION INTRAVENOUS CONTINUOUS PRN
Status: DISCONTINUED | OUTPATIENT
Start: 2024-04-22 | End: 2024-04-22 | Stop reason: SDUPTHER

## 2024-04-22 RX ORDER — MEPERIDINE HYDROCHLORIDE 25 MG/ML
12.5 INJECTION INTRAMUSCULAR; INTRAVENOUS; SUBCUTANEOUS
Status: DISCONTINUED | OUTPATIENT
Start: 2024-04-22 | End: 2024-04-22 | Stop reason: HOSPADM

## 2024-04-22 RX ORDER — LIDOCAINE HYDROCHLORIDE 20 MG/ML
INJECTION, SOLUTION INTRAVENOUS PRN
Status: DISCONTINUED | OUTPATIENT
Start: 2024-04-22 | End: 2024-04-22 | Stop reason: SDUPTHER

## 2024-04-22 RX ORDER — ONDANSETRON 2 MG/ML
INJECTION INTRAMUSCULAR; INTRAVENOUS PRN
Status: DISCONTINUED | OUTPATIENT
Start: 2024-04-22 | End: 2024-04-22 | Stop reason: SDUPTHER

## 2024-04-22 RX ORDER — MAGNESIUM HYDROXIDE 1200 MG/15ML
LIQUID ORAL CONTINUOUS PRN
Status: DISCONTINUED | OUTPATIENT
Start: 2024-04-22 | End: 2024-04-22 | Stop reason: HOSPADM

## 2024-04-22 RX ORDER — ACETAMINOPHEN 500 MG
1000 TABLET ORAL ONCE
Status: COMPLETED | OUTPATIENT
Start: 2024-04-22 | End: 2024-04-22

## 2024-04-22 RX ORDER — FENTANYL CITRATE 0.05 MG/ML
50 INJECTION, SOLUTION INTRAMUSCULAR; INTRAVENOUS EVERY 10 MIN PRN
Status: DISCONTINUED | OUTPATIENT
Start: 2024-04-22 | End: 2024-04-22 | Stop reason: HOSPADM

## 2024-04-22 RX ORDER — ROPIVACAINE HYDROCHLORIDE 5 MG/ML
INJECTION, SOLUTION EPIDURAL; INFILTRATION; PERINEURAL PRN
Status: DISCONTINUED | OUTPATIENT
Start: 2024-04-22 | End: 2024-04-22 | Stop reason: SDUPTHER

## 2024-04-22 RX ORDER — OXYCODONE HYDROCHLORIDE 5 MG/1
5 TABLET ORAL
Status: DISCONTINUED | OUTPATIENT
Start: 2024-04-22 | End: 2024-04-22 | Stop reason: HOSPADM

## 2024-04-22 RX ORDER — NALOXONE HYDROCHLORIDE 0.4 MG/ML
INJECTION, SOLUTION INTRAMUSCULAR; INTRAVENOUS; SUBCUTANEOUS PRN
Status: DISCONTINUED | OUTPATIENT
Start: 2024-04-22 | End: 2024-04-22 | Stop reason: HOSPADM

## 2024-04-22 RX ORDER — METOCLOPRAMIDE HYDROCHLORIDE 5 MG/ML
10 INJECTION INTRAMUSCULAR; INTRAVENOUS
Status: DISCONTINUED | OUTPATIENT
Start: 2024-04-22 | End: 2024-04-22 | Stop reason: HOSPADM

## 2024-04-22 RX ORDER — ROCURONIUM BROMIDE 10 MG/ML
INJECTION, SOLUTION INTRAVENOUS PRN
Status: DISCONTINUED | OUTPATIENT
Start: 2024-04-22 | End: 2024-04-22 | Stop reason: SDUPTHER

## 2024-04-22 RX ORDER — DEXAMETHASONE SODIUM PHOSPHATE 10 MG/ML
INJECTION INTRAMUSCULAR; INTRAVENOUS PRN
Status: DISCONTINUED | OUTPATIENT
Start: 2024-04-22 | End: 2024-04-22 | Stop reason: SDUPTHER

## 2024-04-22 RX ORDER — KETOROLAC TROMETHAMINE 30 MG/ML
30 INJECTION, SOLUTION INTRAMUSCULAR; INTRAVENOUS ONCE
Status: COMPLETED | OUTPATIENT
Start: 2024-04-22 | End: 2024-04-22

## 2024-04-22 RX ORDER — ONDANSETRON 2 MG/ML
4 INJECTION INTRAMUSCULAR; INTRAVENOUS
Status: DISCONTINUED | OUTPATIENT
Start: 2024-04-22 | End: 2024-04-22 | Stop reason: HOSPADM

## 2024-04-22 RX ORDER — PROPOFOL 10 MG/ML
INJECTION, EMULSION INTRAVENOUS PRN
Status: DISCONTINUED | OUTPATIENT
Start: 2024-04-22 | End: 2024-04-22 | Stop reason: SDUPTHER

## 2024-04-22 RX ORDER — FENTANYL CITRATE 50 UG/ML
INJECTION, SOLUTION INTRAMUSCULAR; INTRAVENOUS PRN
Status: DISCONTINUED | OUTPATIENT
Start: 2024-04-22 | End: 2024-04-22 | Stop reason: SDUPTHER

## 2024-04-22 RX ORDER — DIPHENHYDRAMINE HYDROCHLORIDE 50 MG/ML
12.5 INJECTION INTRAMUSCULAR; INTRAVENOUS
Status: DISCONTINUED | OUTPATIENT
Start: 2024-04-22 | End: 2024-04-22 | Stop reason: HOSPADM

## 2024-04-22 RX ADMIN — CEFAZOLIN 2000 MG: 2 INJECTION, POWDER, FOR SOLUTION INTRAMUSCULAR; INTRAVENOUS at 09:46

## 2024-04-22 RX ADMIN — PROPOFOL 40 MG: 10 INJECTION, EMULSION INTRAVENOUS at 09:32

## 2024-04-22 RX ADMIN — SODIUM CHLORIDE, POTASSIUM CHLORIDE, SODIUM LACTATE AND CALCIUM CHLORIDE: 600; 310; 30; 20 INJECTION, SOLUTION INTRAVENOUS at 10:08

## 2024-04-22 RX ADMIN — KETOROLAC TROMETHAMINE 30 MG: 30 INJECTION INTRAMUSCULAR; INTRAVENOUS at 07:39

## 2024-04-22 RX ADMIN — MIDAZOLAM HYDROCHLORIDE 2 MG: 1 INJECTION, SOLUTION INTRAMUSCULAR; INTRAVENOUS at 09:16

## 2024-04-22 RX ADMIN — PHENYLEPHRINE HYDROCHLORIDE 150 MCG: 10 INJECTION INTRAVENOUS at 11:32

## 2024-04-22 RX ADMIN — ONDANSETRON 4 MG: 2 INJECTION INTRAMUSCULAR; INTRAVENOUS at 11:52

## 2024-04-22 RX ADMIN — PHENYLEPHRINE HYDROCHLORIDE 150 MCG: 10 INJECTION INTRAVENOUS at 10:39

## 2024-04-22 RX ADMIN — SODIUM CHLORIDE, POTASSIUM CHLORIDE, SODIUM LACTATE AND CALCIUM CHLORIDE: 600; 310; 30; 20 INJECTION, SOLUTION INTRAVENOUS at 09:16

## 2024-04-22 RX ADMIN — SUGAMMADEX 200 MG: 100 INJECTION, SOLUTION INTRAVENOUS at 12:14

## 2024-04-22 RX ADMIN — DEXAMETHASONE SODIUM PHOSPHATE 8 MG: 4 INJECTION INTRA-ARTICULAR; INTRALESIONAL; INTRAMUSCULAR; INTRAVENOUS; SOFT TISSUE at 07:39

## 2024-04-22 RX ADMIN — DEXAMETHASONE SODIUM PHOSPHATE 10 MG: 10 INJECTION INTRAMUSCULAR; INTRAVENOUS at 09:46

## 2024-04-22 RX ADMIN — ROCURONIUM BROMIDE 20 MG: 10 INJECTION, SOLUTION INTRAVENOUS at 11:17

## 2024-04-22 RX ADMIN — FENTANYL CITRATE 50 MCG: 50 INJECTION, SOLUTION INTRAMUSCULAR; INTRAVENOUS at 09:24

## 2024-04-22 RX ADMIN — ROPIVACAINE HYDROCHLORIDE 30 ML: 5 INJECTION, SOLUTION EPIDURAL; INFILTRATION; PERINEURAL at 08:27

## 2024-04-22 RX ADMIN — ROCURONIUM BROMIDE 50 MG: 10 INJECTION, SOLUTION INTRAVENOUS at 09:24

## 2024-04-22 RX ADMIN — PHENYLEPHRINE HYDROCHLORIDE 100 MCG: 10 INJECTION INTRAVENOUS at 11:17

## 2024-04-22 RX ADMIN — ROCURONIUM BROMIDE 20 MG: 10 INJECTION, SOLUTION INTRAVENOUS at 10:24

## 2024-04-22 RX ADMIN — SODIUM CHLORIDE, POTASSIUM CHLORIDE, SODIUM LACTATE AND CALCIUM CHLORIDE: 600; 310; 30; 20 INJECTION, SOLUTION INTRAVENOUS at 08:23

## 2024-04-22 RX ADMIN — MIDAZOLAM HYDROCHLORIDE 2 MG: 1 INJECTION, SOLUTION INTRAMUSCULAR; INTRAVENOUS at 08:23

## 2024-04-22 RX ADMIN — ACETAMINOPHEN 1000 MG: 500 TABLET ORAL at 07:43

## 2024-04-22 RX ADMIN — LIDOCAINE HYDROCHLORIDE 80 MG: 20 INJECTION, SOLUTION INTRAVENOUS at 09:24

## 2024-04-22 RX ADMIN — PROPOFOL 200 MG: 10 INJECTION, EMULSION INTRAVENOUS at 09:24

## 2024-04-22 ASSESSMENT — PAIN DESCRIPTION - LOCATION
LOCATION: LEG;BACK
LOCATION: LEG
LOCATION: LEG
LOCATION: BACK
LOCATION: LEG

## 2024-04-22 ASSESSMENT — PAIN SCALES - GENERAL
PAINLEVEL_OUTOF10: 6
PAINLEVEL_OUTOF10: 6
PAINLEVEL_OUTOF10: 3
PAINLEVEL_OUTOF10: 0
PAINLEVEL_OUTOF10: 4
PAINLEVEL_OUTOF10: 2
PAINLEVEL_OUTOF10: 6

## 2024-04-22 ASSESSMENT — PAIN - FUNCTIONAL ASSESSMENT
PAIN_FUNCTIONAL_ASSESSMENT: ACTIVITIES ARE NOT PREVENTED
PAIN_FUNCTIONAL_ASSESSMENT: PREVENTS OR INTERFERES SOME ACTIVE ACTIVITIES AND ADLS
PAIN_FUNCTIONAL_ASSESSMENT: ACTIVITIES ARE NOT PREVENTED

## 2024-04-22 ASSESSMENT — PAIN DESCRIPTION - ORIENTATION
ORIENTATION: LOWER
ORIENTATION: RIGHT;LOWER
ORIENTATION: RIGHT

## 2024-04-22 ASSESSMENT — PAIN DESCRIPTION - ONSET
ONSET: ON-GOING
ONSET: GRADUAL

## 2024-04-22 ASSESSMENT — PAIN DESCRIPTION - DESCRIPTORS
DESCRIPTORS: ACHING
DESCRIPTORS: SORE
DESCRIPTORS: ACHING
DESCRIPTORS: SORE;ACHING

## 2024-04-22 ASSESSMENT — PAIN DESCRIPTION - PAIN TYPE
TYPE: ACUTE PAIN
TYPE: SURGICAL PAIN

## 2024-04-22 ASSESSMENT — PAIN DESCRIPTION - FREQUENCY: FREQUENCY: CONTINUOUS

## 2024-04-22 NOTE — ANESTHESIA PROCEDURE NOTES
Peripheral Block    Patient location during procedure: pre-op  Reason for block: post-op pain management and at surgeon's request  Start time: 4/22/2024 8:24 AM  End time: 4/22/2024 8:30 AM  Staffing  Performed: anesthesiologist   Anesthesiologist: Carlos Ho MD  Performed by: Carlos Ho MD  Authorized by: Carlos Ho MD    Preanesthetic Checklist  Completed: patient identified, IV checked, site marked, risks and benefits discussed, surgical/procedural consents, equipment checked, pre-op evaluation, timeout performed, anesthesia consent given, oxygen available and monitors applied/VS acknowledged  Peripheral Block   Patient position: sitting  Prep: ChloraPrep  Provider prep: mask and sterile gloves (Sterile probe cover)  Patient monitoring: cardiac monitor, continuous pulse ox, frequent blood pressure checks and IV access  Block type: Erector spinae  Laterality: bilateral  Injection technique: single-shot  Guidance: ultrasound guided  Local infiltration: ropivacaine (20 cc of NS added)  Infiltration strength: 0.5 %  Local infiltration: ropivacaine (20 cc of NS added)  Dose: 30 mL    Needle   Needle type: combined needle/nerve stimulator   Needle gauge: 21 G  Needle localization: anatomical landmarks and ultrasound guidance  Needle length: 10 cm  Assessment   Injection assessment: negative aspiration for heme, no paresthesia on injection and local visualized surrounding nerve on ultrasound  Paresthesia pain: immediately resolved  Slow fractionated injection: yes  Hemodynamics: stable    Additional Notes  Ultrasound image printed and saved in patient chart.    Sterile probe cover used

## 2024-04-22 NOTE — INTERVAL H&P NOTE
Update History & Physical    The patient's History and Physical of  was reviewed with the patient and I examined the patient. There was no change. The surgical site was confirmed by the patient and me.     Plan: The risks, benefits, expected outcome, and alternative to the recommended procedure have been discussed with the patient. Patient understands and wants to proceed with the procedure.     Electronically signed by GRANT LYNN MD on 4/22/2024 at 7:08 AM

## 2024-04-22 NOTE — BRIEF OP NOTE
Brief Postoperative Note      Patient: Vasquez Alexander  YOB: 1980  MRN: [id number]    Date of Procedure: 4/22/2024    Pre-Op Diagnosis Codes:     * Other intervertebral disc displacement, lumbosacral region [M51.27]    Post-Op Diagnosis: Same       Procedure(s):  RIGHT L5-S1 MICRODISCECTOMY.    Surgeon(s):  Grant Vargas MD    Assistant:  First Assistant: Avani Chaudhry PA-C    Anesthesia: General    Estimated Blood Loss (mL): Minimal    Complications: None        Findings:  Infection Present At Time Of Surgery (PATOS) (choose all levels that have infection present):  No infection present  Other Findings: Inferiorly migrated extruded disc right L5-S1    Electronically signed by GRANT VARGAS MD on 4/22/2024 at 12:09 PM

## 2024-04-22 NOTE — DISCHARGE SUMMARY
found.    Discharge Plan   Disposition: Home    Provider Follow-Up:   Grant Vargas MD  [address] [id number] [phone]    Schedule an appointment as soon as possible for a visit in 1 month(s)  FOR POST OP CHECK       Hospital/Incidental Findings Requiring Follow-Up:  Postop recheck 1 month.  See discharge instructions    Patient Instructions   Diet: regular diet    Activity: activity as tolerated    Other Instructions:   See discharge instructions  Discharge Medications         Medication List        START taking these medications      HYDROcodone-acetaminophen 5-325 MG per tablet  Commonly known as: Norco  Take 1 tablet by mouth every 6 hours as needed for Pain for up to 7 days. Intended supply: 7 days. Take lowest dose possible to manage pain Max Daily Amount: 4 tablets            CONTINUE taking these medications      traMADol 50 MG tablet  Commonly known as: ULTRAM            STOP taking these medications      phentermine 37.5 MG tablet  Commonly known as: ADIPEX-P               Where to Get Your Medications        These medications were sent to [facility] - [place], [place] - [address] - P [phone] - F [phone]  [address] [place] [address] [place] [address]      Phone: [phone]   HYDROcodone-acetaminophen 5-325 MG per tablet         Electronically signed by GRANT VARGAS MD on 4/22/24 at 12:12 PM EDT

## 2024-04-22 NOTE — ANESTHESIA POSTPROCEDURE EVALUATION
Department of Anesthesiology  Postprocedure Note    Patient: Vasquez Alexander  MRN: [id number]  YOB: 1980  Date of evaluation: 4/22/2024    Procedure Summary       Date: 04/22/24 Room / Location: [address]    Anesthesia Start: 0916 Anesthesia Stop: 1226    Procedure: RIGHT L5-S1 MICRODISCECTOMY. (Right: Spine Lumbar) Diagnosis:       Other intervertebral disc displacement, lumbosacral region      (Other intervertebral disc displacement, lumbosacral region [M51.27])    Surgeons: Nery Vargas MD Responsible Provider: Carlos Ho MD    Anesthesia Type: general, regional ASA Status: 2            Anesthesia Type: No value filed.    Yaniv Phase I: Yaniv Score: 10    Yaniv Phase II:      Anesthesia Post Evaluation    Patient location during evaluation: PACU  Patient participation: complete - patient participated  Level of consciousness: awake and alert  Pain score: 0  Airway patency: patent  Nausea & Vomiting: no vomiting and no nausea  Cardiovascular status: hemodynamically stable  Respiratory status: face mask  Hydration status: stable  Multimodal analgesia pain management approach  Pain management: adequate    No notable events documented.

## 2024-04-22 NOTE — ANESTHESIA PRE PROCEDURE
12/20/2023 09:16 AM    GFRAA >60.0 11/06/2020 07:05 AM    LABGLOM >60.0 12/20/2023 09:16 AM    GLUCOSE 100 12/20/2023 09:16 AM    PROT 8.1 12/20/2023 09:16 AM    CALCIUM 9.8 12/20/2023 09:16 AM    BILITOT 0.5 12/20/2023 09:16 AM    ALKPHOS 86 12/20/2023 09:16 AM    AST 25 12/20/2023 09:16 AM    ALT 40 12/20/2023 09:16 AM       POC Tests: No results for input(s): \"POCGLU\", \"POCNA\", \"POCK\", \"POCCL\", \"POCBUN\", \"POCHEMO\", \"POCHCT\" in the last 72 hours.    Coags: No results found for: \"PROTIME\", \"INR\", \"APTT\"    HCG (If Applicable): No results found for: \"PREGTESTUR\", \"PREGSERUM\", \"HCG\", \"HCGQUANT\"     ABGs: No results found for: \"PHART\", \"PO2ART\", \"DDR3KMD\", \"CYM2FWV\", \"BEART\", \"W1JZLFLF\"     Type & Screen (If Applicable):  No results found for: \"LABABO\", \"LABRH\"    Drug/Infectious Status (If Applicable):  No results found for: \"HIV\", \"HEPCAB\"    COVID-19 Screening (If Applicable): No results found for: \"COVID19\"        Anesthesia Evaluation    Airway: Mallampati: I  TM distance: >3 FB   Neck ROM: full  Mouth opening: > = 3 FB   Dental: normal exam         Pulmonary: breath sounds clear to auscultation  (+)     sleep apnea: on noncompliant,                                  Cardiovascular:Negative CV ROS            Rhythm: regular                      Neuro/Psych:   (+) neuromuscular disease:            GI/Hepatic/Renal:            ROS comment: Obesity BMI 34.05.   Endo/Other:    (+) : arthritis:..                 Abdominal:             Vascular: negative vascular ROS.         Other Findings:       Anesthesia Plan      general and regional     ASA 2     (US guided CHARLENE block)  Induction: intravenous.    MIPS: Prophylactic antiemetics administered.  Anesthetic plan and risks discussed with patient.    Use of blood products discussed with patient whom consented to blood products.    Plan discussed with CRNA.    Attending anesthesiologist reviewed and agrees with Preprocedure content      Post-op pain plan if not by surgeon:

## 2024-04-22 NOTE — DISCHARGE INSTRUCTIONS
Every day after surgery change dressing frequently to keep wound clean and dry using 4X4 gauze pads and paper tape.    May shower in 3 days postoperative.    Do not soak or soap wound for one week post operative.    Discharge prescriptions e-prescribed to pharmacy.  Order done  as outpatient.  [place] 5/325 #28.  [facility] outpatient pharmacy    Recheck one month.    Questions call office .      [facility]  Outpatient Discharge Instructions    To continue your care at home, please follow the instructions below and any additional discharge instructions given to you by your physician.    GENERAL ANESTHESIA:  Do not drive or operate machinery for 24hrs after discharge,  Do not drink alcohol, take tranquilizers, sleeping medication, or any other medication not directly instructed by your physician,   Do not make any important decisions or sign any legal documents for 24hrs after surgery,  Have someone with you for 24hrs after surgery to assist you as needed.    ACTIVITY:  Light activity for 24hrs,  No heavy lifting or exercise until instructed by your physician,  You may resume normal activities once instructed by your physician,  Special Instruction: ___________________________________________________________________    FLUIDS AND DIET:  An upset stomach or feeling sick (nausea) can commonly occur after surgery and/or pain medication use. To help minimize nausea:  Do not eat a heavy meal soon after your surgery,    Start with water or other clear liquids,  Advance to mild or bland items like Jell-O, dry toast, crackers, etc.,  Avoid caffeine,  Do not drink alcohol for at least 24 hours after surgery,  Your physician may prescribe anti-nausea medication if your nausea continues,  If you are free from nausea for 24hrs, you can advance to your normal diet as tolerated.    OPERATIVE SITE:  A small amount of bleeding or drainage after surgery is normal. Your physician will provide you with specific

## 2024-04-22 NOTE — OP NOTE
[facility]                   [address] [place] [address]                            OPERATIVE REPORT      PATIENT NAME: VIOLETTE BINGHAM               : 1980  MED REC NO: [id number]                        ROOM: MLOZ OR Pool  ACCOUNT NO: [id number]                       ADMIT DATE: 2024  PROVIDER: Nery Vargas MD      DATE OF PROCEDURE:  2024    SURGEON:  Nery Vargsa MD    PREOPERATIVE DIAGNOSIS:  Right L5-S1 extruded disk.    POSTOPERATIVE DIAGNOSIS:  Right L5-S1 extruded disk.    OPERATION PERFORMED:  Right L5-S1 microdiskectomy.    DESCRIPTION OF PROCEDURE:  The patient was given general endotracheal anesthesia in supine position.  Turned in the prone position.  Ancef IV preoperatively.  The back was prepped and draped.  Time-out, patient identified.  Needle was placed.  Lateral x-rays obtained to confirm level.  Needle was withdrawn.  Skin infiltrated with lidocaine with epinephrine.  Skin incision was made over the tips of spinous process of L5 and S1.  Preoperative MRI study showed an inferiorly migrated disk over the back of the body of S1-S2.  Therefore, the incision was made slightly larger to ensure successful decompression.  Dissection was carried down through the thick subcutaneous fat to the fascia.  The fascia was excised to the right side of spinous process of L5, S1, and S2.  The lamina was exposed.  Self-retaining micro retractor was placed.  Needle was placed.  Lateral x-rays obtained to confirm level.  Needles withdrawn.  With use of the microscope, a partial hemilaminectomy at the inferior aspect of L5 was performed working superiorly not laterally doing a very small medial facetectomy keeping the facet joint intact, detaching the hypertrophied ligamentum flavum.  Laminectomy on the right side of the S1 lamina was made to the superior aspect of the S2 lamina.  The lateral dural edge was identified in the S1 nerve root.  The epidural fat was

## 2024-04-22 NOTE — PROGRESS NOTES
Pt discharged via wheelchair.  All personal belongings and discharge instructions taken wit patient.

## 2024-04-22 NOTE — PROGRESS NOTES
CLINICAL PHARMACY NOTE: MEDS TO BEDS    Total # of Prescriptions Filled: 1   The following medications were delivered to the patient:  Hydrocodone/APAP 5-325 mg tab    Additional Documentation:

## 2024-05-07 ENCOUNTER — OFFICE VISIT (OUTPATIENT)
Dept: PAIN MANAGEMENT | Age: 44
End: 2024-05-07

## 2024-05-07 VITALS
SYSTOLIC BLOOD PRESSURE: 122 MMHG | BODY MASS INDEX: 34.07 KG/M2 | TEMPERATURE: 97.7 F | DIASTOLIC BLOOD PRESSURE: 82 MMHG | WEIGHT: 230 LBS | HEIGHT: 69 IN

## 2024-05-07 DIAGNOSIS — Z48.89 ENCOUNTER FOR POSTOPERATIVE WOUND CHECK: Primary | ICD-10-CM

## 2024-05-07 DIAGNOSIS — M51.27 LUMBOSACRAL DISC HERNIATION: ICD-10-CM

## 2024-05-07 PROCEDURE — 99024 POSTOP FOLLOW-UP VISIT: CPT | Performed by: NURSE PRACTITIONER

## 2024-05-07 NOTE — PROGRESS NOTES
Pt here today for post op f/u.  Patient is status post right L5-S1 microdiscectomy with Dr. Vargas on 4/22/2024.  Prior to surgery patient was seen by pain management and had a right L5-S1 transforaminal epidural steroid injection.  It was noted a large right inferior migrated L5-S1 extruded disc per Dr. Vargas's consult note.  He was discharged home with Norco 5 mg 4 a day for 7-day supply.  He ran out of this. He has used tramadol PRN.  He does follow with pain management and gets Lyrica 100 mg 3 times daily. He stopped lyrica.  He says he is \"definitely better\" with his leg and tightness in leg.  \"I feel like a whole new person\".  Still having low back pain. Think maybe over did it over it over the weekend.    Walks smoothly and gets in and out of chair slowly.    Incision: clean, dry, well approximated. Glue still present.  Non TTP.  Negative SLR today.    Pain level: 5/10       PLAN: He is doing well. Off Lyrica.  Has tramadol Rx if needed. Keep f/u as scheduled with Dr. Vargas. PT eval ordered.

## 2024-05-14 NOTE — PROGRESS NOTES
Patient Name: Vasquez Alexander : 1980        Date: 2024      Type of Appt: Post Op    Reason for appt: [facility] - [facility] - RIGHT L5-S1 MICRODISKECTOMY 24 - RIGHT L5-S1 MICRODISKECTOMY     Pt last seen by Dr Lynn on 2024    Post Op visit with Carlee Rendon on 24    Studies done:  NO NEW STUDIES    Surgeries: [facility] - 24 - RIGHT L5-S1 MICRODISKECTOMY     Physical therapy: 24 ORDERED BY [facility]  [address], [address]  [place], [place]  P: [phone]  F: [phone]      Patient: Vasquez Alexander  YOB: 1980  Date: 2024    The patient is a 43 y.o. male who presents today for follow up.    Patient doing well with no sciatica pain.  He does have a catch pain on his right side when he moves especially in the morning or when he is sitting and tries to get up.    Wound is well-healed gets VISA walks well including regular walk toe walk heel walk.    Plan start physical therapy anti-inflammatory medications.  He will contact us when he is ready to return to work.          GRANT LYNN MD

## 2024-05-21 ENCOUNTER — OFFICE VISIT (OUTPATIENT)
Dept: NEUROSURGERY | Age: 44
End: 2024-05-21

## 2024-05-21 VITALS
DIASTOLIC BLOOD PRESSURE: 82 MMHG | WEIGHT: 230 LBS | BODY MASS INDEX: 34.07 KG/M2 | TEMPERATURE: 97.6 F | SYSTOLIC BLOOD PRESSURE: 122 MMHG | HEIGHT: 69 IN

## 2024-05-21 DIAGNOSIS — M51.27 HERNIATED NUCLEUS PULPOSUS, L5-S1, RIGHT: Primary | ICD-10-CM

## 2024-05-21 PROCEDURE — 99024 POSTOP FOLLOW-UP VISIT: CPT | Performed by: NEUROLOGICAL SURGERY

## 2024-05-28 ENCOUNTER — HOSPITAL ENCOUNTER (OUTPATIENT)
Dept: PHYSICAL THERAPY | Age: 44
Setting detail: THERAPIES SERIES
Discharge: HOME | End: 2024-05-28
Payer: COMMERCIAL

## 2024-05-28 PROCEDURE — G0283 ELEC STIM OTHER THAN WOUND: HCPCS

## 2024-05-28 PROCEDURE — 97162 PT EVAL MOD COMPLEX 30 MIN: CPT

## 2024-05-28 ASSESSMENT — PAIN DESCRIPTION - LOCATION: LOCATION: BACK

## 2024-05-28 ASSESSMENT — PAIN DESCRIPTION - DESCRIPTORS: DESCRIPTORS: ACHING;SHARP

## 2024-05-28 ASSESSMENT — PAIN SCALES - GENERAL: PAINLEVEL_OUTOF10: 7

## 2024-05-28 NOTE — PLAN OF CARE
PHYSICAL THERAPY PLAN OF CARE    [address]   [place] [address]      Phone:[phone]    [] Certification  [] Recertification [x]  Plan of Care  [] Progress Note [] Discharge      Referring Provider: Carlee Rendon, LOW - CNP     From:  Millicent Comer, PT    Patient: Vasquez Alexander (43 y.o. male) : 1980 Date: 2024   Medical Diagnosis: Lumbosacral disc herniation [M51.27]      Treatment Diagnosis: LBP     Progress Report Period from:  2024  to 2024    Visits to Date: 1 No Show: 0 Cancelled Appts: 0    OBJECTIVE:   Short Term Goals - Time Frame for Short Term Goals: 2 weeks    Goals Current/Discharge status  Status   Short Term Goal 1: The pt will demonstrate improved postural awareness requiring <25% VC's during exercises  General Observations  Description: fwd head rounded shoulders guarded, shifts wt due to pain  STG Goal 1 Status:: New   Short Term Goal 2: Decrease LBP pain to 0-1/10 to enable functional improvements in ADL's and wake without increase pain .  Pain Screening  Patient Currently in Pain: Yes  Pain Assessment: 0-10  Pain Level: 7  Best Pain Level: 3  Worst Pain Level: 10 (getting out of bed)  Pain Location: Back  Pain Descriptors: Aching, Sharp   STG Goal 2 Status:: New   Short Term Goal 3: Demo increased B hamstring flexibility -25° at 90/90 hip/knee to allow improved upright posture.  90/90 SLR (Hamstring Tightness): Hamstring flexibility -40°R, -40°L at 90/90 hip/knee position.   STG Goal 3 Status:: New   Long Term Goals - Time Frame for Long Term Goals : 4-6 weeks  Goals Current/ Discharge status Status   Long Term Goal 1: Indep HEP for symptom management Needs Written HEP initiated  for symptom management  Needs progression for comprehensive program development. LTG Goal 1 Status:: New   Long Term Goal 2: Pt demo improved overall function by reporting greater than 70% per functional survey score Exam: Mod Oswestry 24/50=52% functional   LTG

## 2024-05-28 NOTE — PROGRESS NOTES
[address]   [address]   [place] [id number]  Phone: [phone]                             Physical Therapy: Initial Evaluation    Patient: Vasquez Alexander (43 y.o.     male)   Examination Date: 2024   :  1980 ;    Confirmed: Yes MRN: [id number]  CSN: [id number]   Insurance: Payor: BCBS / Plan: BCBS OUT OF STATE / Product Type: *No Product type* /   Insurance ID: [id number] - ([place] BCBS) PT Insurance Information: BCBS Secondary Insurance (if applicable):    Referring Physician: Carlee Rendon, APRN - CNP      PCP: Partha Vargas MD Visits to Date/Visits Approved:     No Show/Cancelled Appts: 0  0     Medical Diagnosis: Lumbosacral disc herniation [M51.27]    Treatment Diagnosis: LBP     PERTINENT MEDICAL HISTORY   Patient Assessed for Rehabilitation Services: Yes       Medical History: Chart Reviewed: Yes No past medical history on file.  Surgical History:   Past Surgical History:   Procedure Laterality Date    COLONOSCOPY      LAMINECTOMY Right 2024    RIGHT L5-S1 MICRODISCECTOMY. performed by Nery [facility]as MD at [facility]    MICRODISCECTOMY LUMBAR Left     L5    NASAL SINUS SURGERY      with tonsillectomy    SHOULDER SURGERY      bursitis    TONSILLECTOMY         Medications:   Current Outpatient Medications:     traMADol (ULTRAM) 50 MG tablet, Take 1 tablet by mouth every 6 hours as needed for Pain., Disp: , Rfl:   Allergies: Patient has no known allergies.      SUBJECTIVE EXAMINATION     History obtained from:: Patient, Chart Review,           Subjective History: Onset Date: 24 (microdiscectomy)  Subjective: 24 R L5-S1 microdiscectomy. Pt reports surgery went well however difficulty getting out of bed in am needing extra effort to get out of bed. Once pt transfers from a position of pain it will subside upon moving. Standing >1 hour starts to increase pain.  Additional Pertinent Hx (if applicable): L L5 S1 microdiscectomy    Imaging: No

## 2024-05-30 ENCOUNTER — TELEPHONE (OUTPATIENT)
Dept: PAIN MANAGEMENT | Age: 44
End: 2024-05-30

## 2024-05-30 NOTE — TELEPHONE ENCOUNTER
Patient called in to request a letter releasing him back to work for Monday, 06/03/2024. Can this be provided?

## 2024-05-30 NOTE — TELEPHONE ENCOUNTER
I called and spoke with patient. He is aware Dr. Vargas approved the letter and it will be at [facility] [address]  for him to  on 5/31/24 when he comes in for p.t.

## 2024-05-31 ENCOUNTER — HOSPITAL ENCOUNTER (OUTPATIENT)
Dept: PHYSICAL THERAPY | Age: 44
Setting detail: THERAPIES SERIES
Discharge: HOME | End: 2024-05-31
Payer: COMMERCIAL

## 2024-05-31 PROCEDURE — 97110 THERAPEUTIC EXERCISES: CPT

## 2024-05-31 PROCEDURE — G0283 ELEC STIM OTHER THAN WOUND: HCPCS

## 2024-05-31 ASSESSMENT — PAIN DESCRIPTION - LOCATION: LOCATION: BACK

## 2024-05-31 ASSESSMENT — PAIN DESCRIPTION - DESCRIPTORS: DESCRIPTORS: ACHING;TIGHTNESS;SORE

## 2024-05-31 ASSESSMENT — PAIN SCALES - GENERAL: PAINLEVEL_OUTOF10: 5

## 2024-05-31 ASSESSMENT — PAIN DESCRIPTION - ORIENTATION: ORIENTATION: RIGHT;LOWER

## 2024-05-31 NOTE — PROGRESS NOTES
[address]   [place] [id number]  Phone:[phone]      Physical TherapyTreatment Note        Date: 2024  Patient: Vasquez Alexander  : 1980   Confirmed: Yes  MRN: [id number]  Referring Provider: Carlee Rendon APRN - CNP      Medical Diagnosis: Lumbosacral disc herniation [M51.27]      Treatment Diagnosis: LBP    Visit Information:  Insurance: Payor: BCBS / Plan: BCBS OUT OF STATE / Product Type: *No Product type* /   PT Visit Information  Onset Date: 24  PT Insurance Information: BCBS  Total # of Visits Approved: 60  Total # of Visits to Date: 2  No Show: 0  Canceled Appointment: 0  Progress Note Counter:     Subjective Information:  Subjective: Pt reported he was pretty sore after the evalution and had difficulty sleeping that night. Pt noted when sneezes or coughs he gets 8/10 pain in the lower back.  HEP Compliance:  [] Good [] Fair [] Poor [x] Reports not doing due to: Initiated this date    Pain Screening  Patient Currently in Pain: Yes  Pain Assessment: 0-10  Pain Level: 5  Pain Location: Back  Pain Orientation: Right, Lower  Pain Descriptors: Aching, Tightness, Sore    Treatment:  Exercises:  Exercises  Exercise 1: Ham stretch 20-30\" x 3, piriformis 20-30\" x 3  Exercise 3: LTR 5\" x10, Sktc 20-30\" x 3  Exercise 4: supine TA iso, marching, walk outs, SLR x 10  Exercise 8: HEP: Hamstring str, piriformis str, LTR, SKTC  Treatment Reasoning  Limitations addressed: Mobility, Strength, Pain modulation, Posture    Modalities:  Cryotherapy (CPT 80939)  Patient Position: L sidelying  Number Minutes Cryotherapy: 10 min  Cryotherapy location: Low back  Post treatment skin assessment: Intact  Limitations addressed: Pain modulation, Edema  Functional ability(s) targeted: Ambulating community distances, Bed mobility  Electric stimulation, unattended (CPT 50508) /  (Medicare)  Patient Position: L sidelying  E-stim location: Low back  E-stim specified location: 10 min

## 2024-06-04 ENCOUNTER — HOSPITAL ENCOUNTER (OUTPATIENT)
Dept: PHYSICAL THERAPY | Age: 44
Setting detail: THERAPIES SERIES
Discharge: HOME | End: 2024-06-04
Payer: COMMERCIAL

## 2024-06-04 NOTE — PROGRESS NOTES
Therapy                            Cancellation/No-show Note    Date: 2024  Patient: Vasquez Alexander (43 y.o. male)  : 1980  MRN:  [id number]  Referring Physician: Carlee Rendon APRN - CNP    Medical Diagnosis: Lumbosacral disc herniation [M51.27]      Visit Information:  Insurance: Payor: BCBS / Plan: BCBS OUT OF STATE / Product Type: *No Product type* /   Visits to Date: 2   No Show/Cancelled Appts: 0 / 1      For today's appointment patient:  [x]  Cancelled  []  Rescheduled appointment  []  No-show   []  Called pt to remind of next appointment     Reason given by patient:  []  Patient ill  []  Conflicting appointment  []  No transportation    []  Conflict with work  []  No reason given  [x]  Other:  can't make it     [x] Pt has future appointments scheduled, no follow up needed  [] Pt requests to be on hold.    Reason:   If > 2 weeks please discuss with therapist.  [] Therapist to call pt for follow up     Comments:       Signature: Electronically signed by Niya Quintana PTA on 24 at 9:07 AM EDT

## 2024-06-06 ENCOUNTER — HOSPITAL ENCOUNTER (OUTPATIENT)
Dept: PHYSICAL THERAPY | Age: 44
Setting detail: THERAPIES SERIES
Discharge: HOME | End: 2024-06-06
Payer: COMMERCIAL

## 2024-06-06 PROCEDURE — 97110 THERAPEUTIC EXERCISES: CPT

## 2024-06-06 ASSESSMENT — PAIN DESCRIPTION - DESCRIPTORS: DESCRIPTORS: TIGHTNESS

## 2024-06-06 ASSESSMENT — PAIN DESCRIPTION - LOCATION: LOCATION: BACK

## 2024-06-06 ASSESSMENT — PAIN DESCRIPTION - ORIENTATION: ORIENTATION: RIGHT;LOWER

## 2024-06-06 ASSESSMENT — PAIN SCALES - GENERAL: PAINLEVEL_OUTOF10: 2

## 2024-06-06 NOTE — PROGRESS NOTES
[address]   [place] [address]  Phone:[phone]      Physical TherapyTreatment Note        Date: 2024  Patient: Vasquez Alexander  : 1980   Confirmed: Yes  MRN: [id number]  Referring Provider: Carlee Rendon APRN - CNP  Medical Diagnosis: Lumbosacral disc herniation [M51.27]   Treatment Diagnosis: LBP    Visit Information:  Insurance: Payor: BCBS / Plan: BCBS OUT OF STATE / Product Type: *No Product type* /   PT Visit Information  Onset Date: 24  PT Insurance Information: BCBS  Total # of Visits Approved: 60  Total # of Visits to Date: 3  No Show: 0  Canceled Appointment: 1  Progress Note Counter: 3/12    Subjective Information:  Subjective: Pt states that his low back is not as bad today. Typically, laying down and getting up is painful and once he gets moving then he's ok. Going back to work on Monday.  HEP Compliance:  [x] Good [] Fair  [] Poor [] Reports not doing due to:    Pain Screening  Patient Currently in Pain: Yes  Pain Assessment: 0-10  Pain Level: 2  Pain Location: Back  Pain Orientation: Right, Lower  Pain Descriptors: Tightness    Treatment:  Exercises:  Exercises  Exercise 1: Ham stretch 20-30\" x 3, piriformis 20-30\" x 3  Exercise 3: LTR 5\" x 10, SKTC 20-30\" x 3  Exercise 4: supine TA iso 10 x 3 sec, TA march 10 x 3\",  TA SLR 10 x 3 sec  Exercise 5: Bridge 10 x 3 sec  Exercise 8: HEP: TA iso, TA march, TA SLR, bridge  Treatment Reasoning  Limitations addressed: Mobility, Strength, Pain modulation, Posture  Functional ability(s) targeted: Tolerance to age appropriate activities, Return to work tasks      *Indicates exercise, modality, or manual techniques to be initiated when appropriate      Assessment:   Body Structures, Functions, Activity Limitations Requiring Skilled Therapeutic Intervention: Decreased posture, Increased pain, Decreased functional mobility , Decreased ADL status, Decreased ROM, Decreased strength  Assessment: Pt did well with

## 2024-06-07 ENCOUNTER — APPOINTMENT (OUTPATIENT)
Dept: PHYSICAL THERAPY | Age: 44
End: 2024-06-07
Payer: COMMERCIAL

## 2024-06-11 ENCOUNTER — HOSPITAL ENCOUNTER (OUTPATIENT)
Dept: PHYSICAL THERAPY | Age: 44
Setting detail: THERAPIES SERIES
Discharge: HOME | End: 2024-06-11
Payer: COMMERCIAL

## 2024-06-11 PROCEDURE — 97110 THERAPEUTIC EXERCISES: CPT

## 2024-06-11 PROCEDURE — G0283 ELEC STIM OTHER THAN WOUND: HCPCS

## 2024-06-11 ASSESSMENT — PAIN SCALES - GENERAL: PAINLEVEL_OUTOF10: 5

## 2024-06-11 ASSESSMENT — PAIN DESCRIPTION - ORIENTATION: ORIENTATION: RIGHT;LOWER

## 2024-06-11 ASSESSMENT — PAIN DESCRIPTION - DESCRIPTORS: DESCRIPTORS: TIGHTNESS

## 2024-06-11 ASSESSMENT — PAIN DESCRIPTION - LOCATION: LOCATION: BACK

## 2024-06-11 NOTE — PROGRESS NOTES
[address] [address]  Phone:[phone]      Physical TherapyTreatment Note        Date: 2024  Patient: Vasquez Alexander  : 1980   Confirmed: Yes  MRN: [id number]  Referring Provider: Carlee Rendon, APRN - CNP      Medical Diagnosis: Lumbosacral disc herniation [M51.27]      Treatment Diagnosis: LBP    Visit Information:  Insurance: Payor: BCBS / Plan: BCBS OUT OF STATE / Product Type: *No Product type* /   PT Visit Information  Onset Date: 24  PT Insurance Information: BCBS  Total # of Visits Approved: 60  Total # of Visits to Date: 4  No Show: 0  Canceled Appointment: 1  Progress Note Counter:     Subjective Information:  Subjective: Pt reports that he went back to work yesterday . Did ok just fatigued.  HEP Compliance:  [x] Good [] Fair [] Poor [] Reports not doing due to:    Pain Screening  Patient Currently in Pain: Yes  Pain Assessment: 0-10  Pain Level: 5  Pain Location: Back  Pain Orientation: Right, Lower  Pain Descriptors: Tightness    Treatment:  Exercises:  Exercises  Exercise 1: piriformis 20-30\" x 3 trialed supine unable then seated unable this date.  Exercise 3: LTR 5\" x 10, SKTC 20-30\" x 3  Exercise 4: supine TA iso 10 x 3 sec, TA march 10 x 3\",  TA SLR 10 x 3 sec  Treatment Reasoning  Limitations addressed: Mobility, Strength, Pain modulation, Posture  Functional ability(s) targeted: Tolerance to age appropriate activities, Return to work tasks      Modalities:  Moist Heat (CPT 47620)  Patient Position: L sidelying  Number Minutes Moist Heat: 10  Moist heat location: Low back  Limitations addressed: Pain modulation  Electric stimulation, unattended (CPT 82487) /  (Medicare)  Patient Position: L sidelying  E-stim location: Low back  E-stim specified location: 10 min with HP  E-stim type: Interferential (IFC)  E-stim via: 4 Electrode Pads  Post treatment skin assessment: Intact  Limitations addressed: Tissue extensibility,

## 2024-06-13 ENCOUNTER — HOSPITAL ENCOUNTER (OUTPATIENT)
Dept: PHYSICAL THERAPY | Age: 44
Setting detail: THERAPIES SERIES
Discharge: HOME | End: 2024-06-13
Payer: COMMERCIAL

## 2024-06-13 PROCEDURE — 97110 THERAPEUTIC EXERCISES: CPT

## 2024-06-13 PROCEDURE — 97140 MANUAL THERAPY 1/> REGIONS: CPT

## 2024-06-13 ASSESSMENT — PAIN DESCRIPTION - DESCRIPTORS: DESCRIPTORS: TIGHTNESS

## 2024-06-13 ASSESSMENT — PAIN DESCRIPTION - ORIENTATION: ORIENTATION: RIGHT;LOWER

## 2024-06-13 ASSESSMENT — PAIN DESCRIPTION - LOCATION: LOCATION: BACK

## 2024-06-13 ASSESSMENT — PAIN SCALES - GENERAL: PAINLEVEL_OUTOF10: 5

## 2024-06-13 NOTE — PROGRESS NOTES
[address]   [place] [id number]  Phone:[phone]      Physical TherapyTreatment Note        Date: 2024  Patient: Vasquez Alexander  : 1980   Confirmed: Yes  MRN: [id number]  Referring Provider: Carlee Rendon APRN - CNP      Medical Diagnosis: Lumbosacral disc herniation [M51.27]      Treatment Diagnosis: LBP    Visit Information:  Insurance: Payor: BCBS / Plan: BCBS OUT OF STATE / Product Type: *No Product type* /   PT Visit Information  Onset Date: 24  PT Insurance Information: BCBS  Total # of Visits Approved: 60  Total # of Visits to Date: 5  No Show: 0  Canceled Appointment: 1  Progress Note Counter:     Subjective Information:  Subjective: Pt reports that the dr gave him a medication to help sleep that allowing him sleep better but feels more stiff in the mornings.  HEP Compliance:  [x] Good [] Fair [] Poor [] Reports not doing due to:    Pain Screening  Patient Currently in Pain: Yes  Pain Assessment: 0-10  Pain Level: 5  Pain Location: Back  Pain Orientation: Right, Lower  Pain Descriptors: Tightness    Treatment:  Exercises:  Exercises  Exercise 1: piriformis 20-30\" x 3 seated  Exercise 3: LTR 5\" x 10, SKTC 20-30\" x 3  Exercise 4: supine TA iso 10 x 3 sec, TA march 10 x 3\",  TA SLR 10 x 3 sec  Exercise 5: unable to bridge this date.       Manual:   Manual Therapy  Soft Tissue Mobilizaton: stm to LB and lumbar parspinals with and without tennis ball 10min  Treatment Reasoning  Limitations addressed: Joint motion, Tissue extensibility    Modalities:  Cryotherapy (CPT 00806)  Patient Position: Prone  Number Minutes Cryotherapy: 10 min  Cryotherapy location: Low back  Post treatment skin assessment: Intact  Limitations addressed: Pain modulation, Edema  Functional ability(s) targeted: Ambulating community distances, Bed mobility       *Indicates exercise, modality, or manual techniques to be initiated when appropriate    Objective Measures:   STG 3 Current

## 2024-06-18 ENCOUNTER — HOSPITAL ENCOUNTER (OUTPATIENT)
Dept: PHYSICAL THERAPY | Age: 44
Setting detail: THERAPIES SERIES
Discharge: HOME | End: 2024-06-18
Payer: COMMERCIAL

## 2024-06-18 NOTE — PROGRESS NOTES
Therapy                            Cancellation/No-show Note    Date: 2024  Patient: Vasquez Alexander (44 y.o. male)  : 1980  MRN:  [id number]  Referring Physician: Carlee Rendon APRN - CNP    Medical Diagnosis: Lumbosacral disc herniation [M51.27]      Visit Information:  Insurance: Payor: BCBS / Plan: BCBS OUT OF STATE / Product Type: *No Product type* /   Visits to Date: 5   No Show/Cancelled Appts: 0 / 2      For today's appointment patient:  [x]  Cancelled  []  Rescheduled appointment  []  No-show   []  Called pt to remind of next appointment     Reason given by patient:  []  Patient ill  []  Conflicting appointment  []  No transportation    [x]  Conflict with work  []  No reason given  []  Other:      [x] Pt has future appointments scheduled, no follow up needed  [] Pt requests to be on hold.    Reason:   If > 2 weeks please discuss with therapist.  [] Therapist to call pt for follow up     Comments:       Signature: Electronically signed by Sarah Rojo PTA on 24 at 11:54 AM EDT

## 2024-06-20 ENCOUNTER — HOSPITAL ENCOUNTER (OUTPATIENT)
Dept: PHYSICAL THERAPY | Age: 44
Setting detail: THERAPIES SERIES
Discharge: HOME | End: 2024-06-20
Payer: COMMERCIAL

## 2024-06-20 NOTE — PROGRESS NOTES
Therapy                            Cancellation/No-show Note    Date: 2024  Patient: Vasquez Alexander (44 y.o. male)  : 1980  MRN:  [id number]  Referring Physician: Carlee Rendon APRN - CNP    Medical Diagnosis: Lumbosacral disc herniation [M51.27]      Visit Information:  Insurance: Payor: BCBS / Plan: BCBS OUT OF STATE / Product Type: *No Product type* /   Visits to Date: 5   No Show/Cancelled Appts: 0 / 3      For today's appointment patient:  [x]  Cancelled  []  Rescheduled appointment  []  No-show   [x]  Called pt to remind of next appointment     Reason given by patient:  []  Patient ill  []  Conflicting appointment  []  No transportation    [x]  Conflict with work  []  No reason given  []  Other:      [x] Pt has future appointments scheduled, no follow up needed  [] Pt requests to be on hold.    Reason:   If > 2 weeks please discuss with therapist.  [] Therapist to call pt for follow up     Comments:       Signature: Electronically signed by Tata Alcaraz PTA on 24 at 8:50 AM EDT

## 2024-06-25 ENCOUNTER — HOSPITAL ENCOUNTER (OUTPATIENT)
Dept: PHYSICAL THERAPY | Age: 44
Setting detail: THERAPIES SERIES
Discharge: HOME | End: 2024-06-25
Payer: COMMERCIAL

## 2024-06-25 PROCEDURE — G0283 ELEC STIM OTHER THAN WOUND: HCPCS

## 2024-06-25 PROCEDURE — 97110 THERAPEUTIC EXERCISES: CPT

## 2024-06-25 PROCEDURE — 97140 MANUAL THERAPY 1/> REGIONS: CPT

## 2024-06-25 ASSESSMENT — PAIN SCALES - GENERAL: PAINLEVEL_OUTOF10: 5

## 2024-06-25 ASSESSMENT — PAIN DESCRIPTION - DESCRIPTORS: DESCRIPTORS: TIGHTNESS

## 2024-06-25 ASSESSMENT — PAIN DESCRIPTION - LOCATION: LOCATION: BACK

## 2024-06-25 ASSESSMENT — PAIN DESCRIPTION - ORIENTATION: ORIENTATION: RIGHT;LOWER

## 2024-06-25 NOTE — PROGRESS NOTES
[address] [address]  Phone:[phone]      Physical TherapyTreatment Note        Date: 2024  Patient: Vasquez Alexander  : 1980   Confirmed: Yes  MRN: [id number]  Referring Provider: Carlee Rendon APRN - CNP      Medical Diagnosis: Lumbosacral disc herniation [M51.27]      Treatment Diagnosis: LBP    Visit Information:  Insurance: Payor: BCBS / Plan: BCBS OUT OF STATE / Product Type: *No Product type* /   PT Visit Information  Onset Date: 24  PT Insurance Information: BCBS  Total # of Visits Approved: 60  Total # of Visits to Date: 6  No Show: 0  Canceled Appointment: 1  Progress Note Counter:     Subjective Information:  Subjective: Pt reports that he is a little more sore today was dancing a little on Friday.  HEP Compliance:  [x] Good [] Fair [] Poor [] Reports not doing due to:    Pain Screening  Patient Currently in Pain: Yes  Pain Assessment: 0-10  Pain Level: 5  Pain Location: Back  Pain Orientation: Right, Lower  Pain Descriptors: Tightness    Treatment:  Exercises:  Exercises  Exercise 1: piriformis 20-30\" x 3 seated  Exercise 2: Prone knee stretch with belt 3 x20 sec  Exercise 3: LTR 5\" x 10, SKTC 20-30\" x 3  Exercise 4: supine TA iso 10 x 5 sec, TA march 10 x 3\",  TA SLR 10 x 3 sec  Exercise 5: unable to bridge this date.  Exercise 6: quad sstretch off mat 1 x30 sec  Exercise 7: 4 way hip GTB x10  Exercise 8: hip abduction x10  Treatment Reasoning  Limitations addressed: Mobility, Strength, Pain modulation, Posture  Functional ability(s) targeted: Tolerance to age appropriate activities, Return to work tasks    Manual:   Manual Therapy  Soft Tissue Mobilizaton: stm to LB and lumbar parspinals without tennis ball 10min  Other: cupping R paraspinals 5 min static hold around above incision  Treatment Reasoning  Limitations addressed: Joint motion, Tissue extensibility    Modalities:  Cryotherapy (CPT 30812)  Patient Position: L

## 2024-06-27 ENCOUNTER — HOSPITAL ENCOUNTER (OUTPATIENT)
Dept: PHYSICAL THERAPY | Age: 44
Setting detail: THERAPIES SERIES
Discharge: HOME | End: 2024-06-27
Payer: COMMERCIAL

## 2024-06-27 PROCEDURE — 97110 THERAPEUTIC EXERCISES: CPT

## 2024-06-27 PROCEDURE — 97140 MANUAL THERAPY 1/> REGIONS: CPT

## 2024-06-27 PROCEDURE — G0283 ELEC STIM OTHER THAN WOUND: HCPCS

## 2024-06-27 ASSESSMENT — PAIN DESCRIPTION - LOCATION: LOCATION: BACK

## 2024-06-27 ASSESSMENT — PAIN DESCRIPTION - DESCRIPTORS: DESCRIPTORS: TIGHTNESS

## 2024-06-27 ASSESSMENT — PAIN DESCRIPTION - ORIENTATION: ORIENTATION: RIGHT;LEFT

## 2024-06-27 NOTE — PROGRESS NOTES
[address]   [place] [id number]  Phone:[phone]      Physical TherapyTreatment Note        Date: 2024  Patient: Vasquez Alexander  : 1980   Confirmed: Yes  MRN: [id number]  Referring Provider: Carlee Rendon APRN - CNP      Medical Diagnosis: Lumbosacral disc herniation [M51.27]      Treatment Diagnosis: LBP    Visit Information:  Insurance: Payor: BCBS / Plan: BCBS OUT OF STATE / Product Type: *No Product type* /   PT Visit Information  Onset Date: 24  PT Insurance Information: BCBS  Total # of Visits Approved: 60  Total # of Visits to Date: 7  No Show: 0  Canceled Appointment: 1  Progress Note Counter:     Subjective Information:  Subjective: Pt reports that he felt real good after last session.  HEP Compliance:  [x] Good [] Fair [] Poor [] Reports not doing due to:    Pain Screening  Patient Currently in Pain: Yes  Pain Assessment: 0-10  Pain Location: Back  Pain Orientation: Right, Left  Pain Descriptors: Tightness    Treatment:  Exercises:  Exercises  Exercise 3: LTR 5\" x 10, SKTC 20-30\" x 3  Exercise 4: supine TA iso 10 x 5 sec, TA march 10 x 3\",  TA [id number]  Exercise 6: quad stretch with strap 30 secx10 prone  Exercise 7: 4 way hip GTB x10       Manual:   Manual Therapy  Soft Tissue Mobilizaton: stm to LB and lumbar parspinals without tennis ball 3min  Other: cupping R paraspinals 5 min static hold around above incision  Treatment Reasoning  Limitations addressed: Joint motion, Tissue extensibility    Modalities:  Moist Heat (CPT 21929)  Number Minutes Moist Heat: 10  Limitations addressed: Pain modulation  Cryotherapy (CPT 32083)  Patient Position: L sidelying  Number Minutes Cryotherapy: 10 min  Cryotherapy location: Low back  Post treatment skin assessment: Intact  Limitations addressed: Pain modulation, Edema  Functional ability(s) targeted: Ambulating community distances, Bed mobility  Electric stimulation, unattended (CPT 73148) /

## 2024-07-09 DIAGNOSIS — G89.4 CHRONIC PAIN SYNDROME: Primary | ICD-10-CM

## 2024-07-09 RX ORDER — TRAMADOL HYDROCHLORIDE 50 MG/1
50 TABLET ORAL 2 TIMES DAILY PRN
Qty: 30 TABLET | Refills: 0 | Status: SHIPPED | OUTPATIENT
Start: 2024-07-09 | End: 2024-07-24

## 2024-07-09 NOTE — TELEPHONE ENCOUNTER
Requested Prescriptions     Pending Prescriptions Disp Refills    traMADol (ULTRAM) 50 MG tablet       Sig: Take 1 tablet by mouth every 6 hours as needed for Pain. Max Daily Amount: 200 mg       Patient last seen on:  5/7/24    LAST SURGERY BY DR. LYNN 4/22/24    Date of last refill:  3/7/24    Reason for request:  patient requesting     Request date for pharmacy pick-up:  TODAY    Next office visit date: Visit date not found    Patient has no known allergies.

## 2025-08-19 ENCOUNTER — OFFICE VISIT (OUTPATIENT)
Age: 45
End: 2025-08-19
Payer: COMMERCIAL

## 2025-08-19 VITALS
OXYGEN SATURATION: 98 % | BODY MASS INDEX: 34.07 KG/M2 | HEIGHT: 69 IN | TEMPERATURE: 98.1 F | HEART RATE: 82 BPM | WEIGHT: 230 LBS | SYSTOLIC BLOOD PRESSURE: 122 MMHG | DIASTOLIC BLOOD PRESSURE: 80 MMHG

## 2025-08-19 DIAGNOSIS — M47.817 LUMBOSACRAL SPONDYLOSIS WITHOUT MYELOPATHY: Primary | ICD-10-CM

## 2025-08-19 PROCEDURE — 99213 OFFICE O/P EST LOW 20 MIN: CPT | Performed by: NURSE PRACTITIONER

## 2025-08-19 RX ORDER — METHYLPREDNISOLONE 4 MG/1
TABLET ORAL
Qty: 1 KIT | Refills: 0 | Status: SHIPPED | OUTPATIENT
Start: 2025-08-19 | End: 2025-08-25

## 2025-08-19 ASSESSMENT — ENCOUNTER SYMPTOMS
BACK PAIN: 1
DIARRHEA: 0
SHORTNESS OF BREATH: 0
NAUSEA: 0
CONSTIPATION: 0
GASTROINTESTINAL NEGATIVE: 1
COUGH: 0
EYES NEGATIVE: 1

## (undated) DEVICE — LABEL MED MINI W/ MARKER

## (undated) DEVICE — SYRINGE MED 30ML STD CLR PLAS LUERLOCK TIP N CTRL DISP

## (undated) DEVICE — COVER MICSCP W46XL120IN 4 BINOC GLS LENS LEICA

## (undated) DEVICE — LIQUIBAND RAPID ADHESIVE 36/CS 0.8ML: Brand: MEDLINE

## (undated) DEVICE — CARBIDE MATCH HEAD

## (undated) DEVICE — KAIRISON TUBING SET PNEUMATIC, (3000 MM), STERILE, DISPOSABLE, TO BE USED WITH: FK898R, PACKAGE OF 10 PIECES: Brand: KAIRISON

## (undated) DEVICE — SUTURE VICRYL SZ 0 L27IN ABSRB UD L26MM CP-2 1/2 CIR SGL J870H

## (undated) DEVICE — FLOSEAL WITH RECOTHROM - 10ML.: Brand: FLOSEAL HEMOSTATIC MATRIX

## (undated) DEVICE — NEURO: Brand: MEDLINE INDUSTRIES, INC.

## (undated) DEVICE — 4-PORT MANIFOLD: Brand: NEPTUNE 2

## (undated) DEVICE — SUTURE VICRYL SZ 4-0 L18IN ABSRB UD L19MM PS-2 3/8 CIR PRIM J496H

## (undated) DEVICE — Z DISCONTINUED USE 2220143 SUTURE VCRL SZ 2-0 L27IN ABSRB UD L36MM CP-1 1/2 CIR REV J266H

## (undated) DEVICE — GLOVE ORANGE PI 7 1/2   MSG9075

## (undated) DEVICE — GLOVE ORANGE PI 8   MSG9080

## (undated) DEVICE — SPONGE DRN W4XL4IN RAYON/POLYESTER 6 PLY NONWOVEN PRECUT 2 PER PK

## (undated) DEVICE — SUTURE ABSORBABLE ANTIBACT 1-0 CT-1 24 IN STRATAFIX PDS + SXPP1A443

## (undated) DEVICE — SUTURE VICRYL SZ 3-0 L18IN ABSRB UD PS-2 L19MM 3/8 CRV PRIM J497H

## (undated) DEVICE — 1010 S-DRAPE TOWEL DRAPE 10/BX: Brand: STERI-DRAPE™